# Patient Record
Sex: FEMALE | NOT HISPANIC OR LATINO | URBAN - METROPOLITAN AREA
[De-identification: names, ages, dates, MRNs, and addresses within clinical notes are randomized per-mention and may not be internally consistent; named-entity substitution may affect disease eponyms.]

---

## 2021-09-17 ENCOUNTER — TELEPHONE (OUTPATIENT)
Dept: TRANSPLANT | Facility: CLINIC | Age: 31
End: 2021-09-17

## 2021-09-17 NOTE — TELEPHONE ENCOUNTER
"N: [id number]  Voucher: Wants More Info Registered As: Family Voucher Donor  Donor Intake Start: 21 Donor Intake Complete: 21  Gender: Female Preferred Language: English  Full Name: Alia Villaseñor Is  Needed: [not answered]  E-mail: [email or web address]@"[email or web address] Phone Number: [phone]  Secondary Phone:  Contact Preference: [not answered] Best Contact Time: 9am - 5pm  Emergency Contact: Shimon Villaseñor Emergency Contact #: [phone]  Relationship to Contact: Contact is my parent  : [birth year] Age: 31  Address: [address]  City: [place]  State: [place] Postal Code: [id number]  Height: 5'4\" Weight: 120lbs  BMI: 20.6  Employment Status: Employed Has PTO for donation? Yes, using vacation  Occupation:  Requires Heavy Lifting? No  Education Level: Four Year Degree Marital Status: Single  Exercise Routine: 4-6/Week Health Insurance: Yes  Blood Type: Unknown Ethnicity/Race: White  Donor Type: Family Voucher Donor  Prefer Remote Donation: [not answered]  Physician: [facility]<br/>CECILE Miles  Motivation to donate: I have been wanting to donate a kidney for the past five or so years. There are people out there  whose lives depend on receiving a kidney, and I don't need two!  Living Donor Pre-Screening  Is In [place]  Will accept blood transfusions? Yes  Has been diagnosed with kidney disease? No  Has had a heart attack? No  Has Diabetes (High BGs)? No  Has had cancer? No  Has had kidney stones? No  Has ever been pregnant? No  Is Planning on Pregnancy? No  Is Taking Birth Control? Yes   - Birth Control Months? 24   - BirthControlForm? pill   - Birth Control Complications? other brands for past 14 years   - Is Able To Stop Birth Control? Yes  Has Used Tobacco? No  Has HIV? No  Is Currently Incarcerated? No  Is Currently Residing in [place] Yes  History Misc  Has Allergies? No  Has had Surgeries? No  Takes Medication? Yes  Medication Dose Frequency  birth control 1 pill " daily  sertraline 100mg daily  Medical History  History of High BP? Never  History of CABG (bypass surgery)? No  History of blood clots? Never  History of coronary disease? Never  History of high cholesterol or triglycerides? Never  Has stents implanted? Yes  History of chest pain during exercise? No  History of chest pain at other times? No  Results of climbing 2 flights of stairs? No Problem  Had stress test in last year? No  Has had stroke? No  Has had leg bypass? No  History of lung disease? Never  History of COPD? Never  History of TB? Never  History of Pneumonia? Never  Has respiratory issues? No  Has HIV? No  Has Gastro Issues? No  History of Gallstones? Never  History of Pancreatitis? Never  History of Liver Disease? Never  History of Hepatitis B? Never  History of Hepatitis C? Never  History of bleeding problems? Never  History of UTIs? Unknown  History of kidney damage? Never  History of Proteinuria? Never  History of Hematuria? Never  History of neuro disease? Never  History of seizure? Never  History of lupus? Never  History of paralysis? Never  History of arthritis? Never  History of neuropathy? Never  History of depression? Treated in past  History of anxiety? Still being treated  History of documented psychiatric illness? Never  History of Fibroid Uterus? Never  History of Endometriosis? Never  History of Polycystic Ovaries? Never  Has had Miscarriages? No  Has had abortions? No  Has had transfusions? No  History of Obesity? No  History of Fabry's Disease? No  History of Sickle Cell Disease? No  History of Sickle Cell Trait? No  History of Sarcoidosis? No  Has auto-immune disease? No  Has had Physical Exam? Yes   - how many years ago: 1  Has had Mammogram? No  Has had Pap Smear? Yes   - how many years ago: 1  Colonoscopy? No  Medical history comments? [no comments]  Living Donor Family Medical History  Anyone with kidney disease? No  Anyone with liver disease? No  Anyone with heart disease? No  Anyone  with coronary artery disease? No  Anyone with high blood pressure? No  Anyone with blood disorder? No  Anyone with cancer? No  Anyone with kidney cancer? No  Anyone with diabetes? No  Is mother alive? Yes  Mother's age? 60  Is father alive? Yes  Father's age? 60  How many siblings? 1  How many adult children? 0  How many children under 18? 0  Social History  Has Used Alcohol? Yes   - currently uses alcohol: Yes   - how much: 4/Weekly  Has Abused Alcohol? No  Has Used Drugs? No  Has had legal issues w/ law enforcement? No  Traveled over 100 miles from home in last year? Yes   - Traveled Where? [place]  Has had suicidal thoughts or attempts in the last five years? No

## 2021-09-20 ENCOUNTER — TELEPHONE (OUTPATIENT)
Dept: TRANSPLANT | Facility: CLINIC | Age: 31
End: 2021-09-20

## 2021-09-20 NOTE — TELEPHONE ENCOUNTER
Contacted Alia Villaseñor to introduce myself and my role, review of medical/surgical/family history and next steps.     Alia Villaseñor  is aware She can stop donor evaluation at any time.     Alia Villaseñor is a 31 year old female that would like to learn more about being a non-directed kidney donor.     Concerns from medical/surgical/family history: none. Takes medication for social anxiety and feels it has been well managed for years.     Reviewed any history of travel in endemic areas: [place]  Strongyloides- [place], [place] and [place].  Trypanosoma cruzi ([place])- [place]  West Nile Virus- [place], [place], [place], [place] and [place].     Per our Phase 1 algorithm, does not meet criteria to do preliminary testing.     Reviewed evaluation testing: Covid PCR, Iohexol, Lab work, CXR, EKG, Provider visits and functions, CT Angiogram.     Reviewed operations of selection committee and angio review meetings and the need for multidisciplinary input.     Reviewed NKR listing and transfer of care to D team if approved.     Alia would like to proceed to evaluation on 10/1/21.     Confirmed that Alia reviewed Informed consent document and all questions answered.  Reviewed that they will receive Docusign to obtain electronic signature for the following: Informed consent, SRTR data, PAUL for medical information, Auth for Electronic communication and will need their signed consent back before proceeding with evaluation.      Encouraged sign up for Lee[employer], message sent.     Message sent for scheduling.

## 2021-09-20 NOTE — TELEPHONE ENCOUNTER
Initial Independent Living Donor Advocate contact made with potential donor today.  I introduced myself and my role during the donation process, includin.  NHAN ROLE   The federal government requires that all licensed transplant centers provide the living donor with an Independent Living Donor Advocate (NHAN).  I do not meet recipients or attend meetings that discuss their care or decision to transplant them. My role is separate to avoid any conflict of interest.  My role is to ensure:  1) your rights are protected;  2) you get all the information you need from the transplant team to make a fully informed decision whether to donate;   3) that living donation is in your best interest.   4) that you have the right to decide NOT to go forward with living donation at any time during this process.  I am available to you throughout the workup, during surgery phase and follow-up at home.   2. WORKUP & PRIVACY     Your identity and workup are not shared with the recipient at any time.     There is a medical donor workup that consists of testing to determine if you are healthy enough to donate.  Workup tests include many blood draws, urine collection/ (kidney function testing), chest x-ray, EKG, CT scan. As you complete each step then you may move on to the next.  Workup can take as little or as long as you need and you can stop the process at any time.     Transplant is a treatment option, not a cure. A kidney from a living kidney donor can last 12-14 years.  Other treatment options are  donation and two types of dialysis.     This is major surgery and your estimated hospital stay is approximately 1-2 nights.  After surgery, there are driving and lifting restrictions - no driving for two weeks and no lifting over ten pounds for 6 - 8 weeks.  Donors are routinely off from work for 4 - 6 weeks after surgery, and potentially longer if they have a physical job.       If you anticipate lost wages due to donation,  donor wage reimbursement options may be available to you and will be reviewed with you during the evaluation process.      The recipient's insurance covers the medical expenses related to the donor evaluation and surgery.  However, it is important for you to carry your own health insurance to address any medical issues that are found and are NOT related to living donation.  3.  QUESTIONS  Have you received a packet from the transplant department?     Questions?    Have you discussed with anyone your potential decision to donate?   Yes.  Is anyone pressuring or coercing you to donate? No.  Have you discussed any financial arrangements with recipient around donating a kidney? No.  Are you aware that you can confidentially opt out at any time, up to and including day of donation? Yes.  At this time, would you like to proceed with the medical evaluation to see if you can be a kidney donor?  Yes.    If yes, the donor coordinator will be reaching out to you with next steps.     You can reach me or someone else on the NHAN team by calling [phone] Option 3.    NHAN NOTES: Alia wants to be a non-directed living kidney donor.    Duration of call 25 minutes

## 2021-09-21 ENCOUNTER — TELEPHONE (OUTPATIENT)
Dept: TRANSPLANT | Facility: CLINIC | Age: 31
End: 2021-09-21

## 2021-09-21 DIAGNOSIS — Z00.5 TRANSPLANT DONOR EVALUATION: Primary | ICD-10-CM

## 2021-09-21 RX ORDER — ALBUTEROL SULFATE 0.83 MG/ML
2.5 SOLUTION RESPIRATORY (INHALATION)
Status: CANCELLED | OUTPATIENT
Start: 2021-10-01

## 2021-09-21 RX ORDER — METHYLPREDNISOLONE SODIUM SUCCINATE 125 MG/2ML
125 INJECTION, POWDER, LYOPHILIZED, FOR SOLUTION INTRAMUSCULAR; INTRAVENOUS
Status: CANCELLED
Start: 2021-10-01

## 2021-09-21 RX ORDER — EPINEPHRINE 1 MG/ML
0.3 INJECTION, SOLUTION, CONCENTRATE INTRAVENOUS EVERY 5 MIN PRN
Status: CANCELLED | OUTPATIENT
Start: 2021-10-01

## 2021-09-21 RX ORDER — NALOXONE HYDROCHLORIDE 0.4 MG/ML
0.2 INJECTION, SOLUTION INTRAMUSCULAR; INTRAVENOUS; SUBCUTANEOUS
Status: CANCELLED | OUTPATIENT
Start: 2021-10-01

## 2021-09-21 RX ORDER — DIPHENHYDRAMINE HYDROCHLORIDE 50 MG/ML
50 INJECTION INTRAMUSCULAR; INTRAVENOUS
Status: CANCELLED
Start: 2021-10-01

## 2021-09-21 RX ORDER — MEPERIDINE HYDROCHLORIDE 25 MG/ML
25 INJECTION INTRAMUSCULAR; INTRAVENOUS; SUBCUTANEOUS EVERY 30 MIN PRN
Status: CANCELLED | OUTPATIENT
Start: 2021-10-01

## 2021-09-21 RX ORDER — ALBUTEROL SULFATE 90 UG/1
1-2 AEROSOL, METERED RESPIRATORY (INHALATION)
Status: CANCELLED
Start: 2021-10-01

## 2021-09-21 NOTE — TELEPHONE ENCOUNTER
Please sched kidney donor eval for 10/1/21 slot 1. Will need Iohexol sched for that day. Please sched labs and COVID test at [facility] lab on Mon 9/27. MyChart is pending. I will put in orders.

## 2021-09-27 ENCOUNTER — LAB (OUTPATIENT)
Dept: LAB | Facility: CLINIC | Age: 31
End: 2021-09-27
Attending: SURGERY

## 2021-09-27 DIAGNOSIS — Z00.5 TRANSPLANT DONOR EVALUATION: ICD-10-CM

## 2021-09-27 LAB
A1 AG RBC QL: NEGATIVE
ABO/RH(D): NORMAL
ALBUMIN SERPL-MCNC: 3.4 G/DL (ref 3.4–5)
ALBUMIN UR-MCNC: NEGATIVE MG/DL
ALP SERPL-CCNC: 113 U/L (ref 40–150)
ALT SERPL W P-5'-P-CCNC: 18 U/L (ref 0–50)
ANION GAP SERPL CALCULATED.3IONS-SCNC: 5 MMOL/L (ref 3–14)
ANTIBODY SCREEN: NEGATIVE
APPEARANCE UR: CLEAR
APTT PPP: 24 SECONDS (ref 22–38)
AST SERPL W P-5'-P-CCNC: 15 U/L (ref 0–45)
B-HCG SERPL-ACNC: <1 IU/L (ref 0–5)
BACTERIA #/AREA URNS HPF: ABNORMAL /HPF
BILIRUB SERPL-MCNC: 0.3 MG/DL (ref 0.2–1.3)
BILIRUB UR QL STRIP: NEGATIVE
BUN SERPL-MCNC: 12 MG/DL (ref 7–30)
CALCIUM SERPL-MCNC: 8.5 MG/DL (ref 8.5–10.1)
CHLORIDE BLD-SCNC: 108 MMOL/L (ref 94–109)
CHOLEST SERPL-MCNC: 215 MG/DL
CMV IGG SERPL IA-ACNC: <0.2 U/ML
CMV IGG SERPL IA-ACNC: NORMAL
CO2 SERPL-SCNC: 26 MMOL/L (ref 20–32)
COLOR UR AUTO: YELLOW
CREAT SERPL-MCNC: 0.81 MG/DL (ref 0.52–1.04)
CREAT UR-MCNC: 92 MG/DL
CREAT UR-MCNC: 92 MG/DL
EBV VCA IGG SER IA-ACNC: 38.6 U/ML
EBV VCA IGG SER IA-ACNC: POSITIVE
EBV VCA IGM SER IA-ACNC: 24.9 U/ML
EBV VCA IGM SER IA-ACNC: NORMAL
ERYTHROCYTE [DISTWIDTH] IN BLOOD BY AUTOMATED COUNT: 12.5 % (ref 10–15)
FASTING STATUS PATIENT QL REPORTED: ABNORMAL
GFR SERPL CREATININE-BSD FRML MDRD: >90 ML/MIN/1.73M2
GLUCOSE BLD-MCNC: 89 MG/DL (ref 70–99)
GLUCOSE UR STRIP-MCNC: NEGATIVE MG/DL
HBA1C MFR BLD: 5 % (ref 0–5.6)
HBV CORE AB SERPL QL IA: NONREACTIVE
HBV SURFACE AB SERPL IA-ACNC: 8.44 M[IU]/ML
HBV SURFACE AG SERPL QL IA: NONREACTIVE
HCT VFR BLD AUTO: 44.3 % (ref 35–47)
HCV AB SERPL QL IA: NONREACTIVE
HDLC SERPL-MCNC: 76 MG/DL
HGB BLD-MCNC: 14.5 G/DL (ref 11.7–15.7)
HGB UR QL STRIP: ABNORMAL
HIV 1+2 AB+HIV1 P24 AG SERPL QL IA: NONREACTIVE
INR PPP: 0.99 (ref 0.85–1.15)
KETONES UR STRIP-MCNC: NEGATIVE MG/DL
LDLC SERPL CALC-MCNC: 120 MG/DL
LEUKOCYTE ESTERASE UR QL STRIP: NEGATIVE
MCH RBC QN AUTO: 30.9 PG (ref 26.5–33)
MCHC RBC AUTO-ENTMCNC: 32.7 G/DL (ref 31.5–36.5)
MCV RBC AUTO: 95 FL (ref 78–100)
MICROALBUMIN UR-MCNC: 6 MG/L
MICROALBUMIN/CREAT UR: 6.52 MG/G CR (ref 0–25)
MUCOUS THREADS #/AREA URNS LPF: PRESENT /LPF
NITRATE UR QL: NEGATIVE
NONHDLC SERPL-MCNC: 139 MG/DL
PH UR STRIP: 5 [PH] (ref 5–7)
PHOSPHATE SERPL-MCNC: 3.7 MG/DL (ref 2.5–4.5)
PLATELET # BLD AUTO: 264 10E3/UL (ref 150–450)
POTASSIUM BLD-SCNC: 4.1 MMOL/L (ref 3.4–5.3)
PROT SERPL-MCNC: 6.9 G/DL (ref 6.8–8.8)
PROT UR-MCNC: 0.11 G/L
PROT/CREAT 24H UR: 0.12 G/G CR (ref 0–0.2)
RBC # BLD AUTO: 4.69 10E6/UL (ref 3.8–5.2)
RBC URINE: 1 /HPF
SARS-COV-2 RNA RESP QL NAA+PROBE: NEGATIVE
SODIUM SERPL-SCNC: 139 MMOL/L (ref 133–144)
SP GR UR STRIP: 1.02 (ref 1–1.03)
SPECIMEN EXPIRATION DATE: NORMAL
SQUAMOUS EPITHELIAL: <1 /HPF
T PALLIDUM AB SER QL: NONREACTIVE
TRIGL SERPL-MCNC: 94 MG/DL
URATE SERPL-MCNC: 3.3 MG/DL (ref 2.6–6)
UROBILINOGEN UR STRIP-MCNC: NORMAL MG/DL
WBC # BLD AUTO: 4.5 10E3/UL (ref 4–11)
WBC URINE: 2 /HPF

## 2021-09-27 PROCEDURE — 84100 ASSAY OF PHOSPHORUS: CPT | Performed by: PATHOLOGY

## 2021-09-27 PROCEDURE — 84550 ASSAY OF BLOOD/URIC ACID: CPT | Performed by: PATHOLOGY

## 2021-09-27 PROCEDURE — 86788 WEST NILE VIRUS AB IGM: CPT | Mod: 90 | Performed by: PATHOLOGY

## 2021-09-27 PROCEDURE — 86789 WEST NILE VIRUS ANTIBODY: CPT | Mod: 90 | Performed by: PATHOLOGY

## 2021-09-27 PROCEDURE — 85730 THROMBOPLASTIN TIME PARTIAL: CPT | Performed by: PATHOLOGY

## 2021-09-27 PROCEDURE — 86803 HEPATITIS C AB TEST: CPT | Mod: 90 | Performed by: PATHOLOGY

## 2021-09-27 PROCEDURE — 86704 HEP B CORE ANTIBODY TOTAL: CPT | Mod: 90 | Performed by: PATHOLOGY

## 2021-09-27 PROCEDURE — 36415 COLL VENOUS BLD VENIPUNCTURE: CPT | Performed by: PATHOLOGY

## 2021-09-27 PROCEDURE — 83036 HEMOGLOBIN GLYCOSYLATED A1C: CPT | Performed by: PATHOLOGY

## 2021-09-27 PROCEDURE — 86780 TREPONEMA PALLIDUM: CPT | Mod: 90 | Performed by: PATHOLOGY

## 2021-09-27 PROCEDURE — 86665 EPSTEIN-BARR CAPSID VCA: CPT | Mod: 90 | Performed by: PATHOLOGY

## 2021-09-27 PROCEDURE — 85027 COMPLETE CBC AUTOMATED: CPT | Performed by: PATHOLOGY

## 2021-09-27 PROCEDURE — 84702 CHORIONIC GONADOTROPIN TEST: CPT | Performed by: PATHOLOGY

## 2021-09-27 PROCEDURE — 87340 HEPATITIS B SURFACE AG IA: CPT | Mod: 90 | Performed by: PATHOLOGY

## 2021-09-27 PROCEDURE — 80061 LIPID PANEL: CPT | Performed by: PATHOLOGY

## 2021-09-27 PROCEDURE — 86481 TB AG RESPONSE T-CELL SUSP: CPT | Mod: 90 | Performed by: PATHOLOGY

## 2021-09-27 PROCEDURE — 86644 CMV ANTIBODY: CPT | Mod: 90 | Performed by: PATHOLOGY

## 2021-09-27 PROCEDURE — 86900 BLOOD TYPING SEROLOGIC ABO: CPT | Mod: 90 | Performed by: PATHOLOGY

## 2021-09-27 PROCEDURE — 81001 URINALYSIS AUTO W/SCOPE: CPT | Performed by: PATHOLOGY

## 2021-09-27 PROCEDURE — 85610 PROTHROMBIN TIME: CPT | Performed by: PATHOLOGY

## 2021-09-27 PROCEDURE — 86901 BLOOD TYPING SEROLOGIC RH(D): CPT | Mod: 90 | Performed by: PATHOLOGY

## 2021-09-27 PROCEDURE — U0005 INFEC AGEN DETEC AMPLI PROBE: HCPCS | Mod: 90 | Performed by: PATHOLOGY

## 2021-09-27 PROCEDURE — 86905 BLOOD TYPING RBC ANTIGENS: CPT | Mod: 90 | Performed by: PATHOLOGY

## 2021-09-27 PROCEDURE — U0003 INFECTIOUS AGENT DETECTION BY NUCLEIC ACID (DNA OR RNA); SEVERE ACUTE RESPIRATORY SYNDROME CORONAVIRUS 2 (SARS-COV-2) (CORONAVIRUS DISEASE [COVID-19]), AMPLIFIED PROBE TECHNIQUE, MAKING USE OF HIGH THROUGHPUT TECHNOLOGIES AS DESCRIBED BY CMS-2020-01-R: HCPCS | Mod: 90 | Performed by: PATHOLOGY

## 2021-09-27 PROCEDURE — 86706 HEP B SURFACE ANTIBODY: CPT | Mod: 90 | Performed by: PATHOLOGY

## 2021-09-27 PROCEDURE — 84156 ASSAY OF PROTEIN URINE: CPT | Performed by: PATHOLOGY

## 2021-09-27 PROCEDURE — 86850 RBC ANTIBODY SCREEN: CPT | Mod: 90 | Performed by: PATHOLOGY

## 2021-09-27 PROCEDURE — 80053 COMPREHEN METABOLIC PANEL: CPT | Performed by: PATHOLOGY

## 2021-09-27 PROCEDURE — 82043 UR ALBUMIN QUANTITATIVE: CPT | Performed by: PATHOLOGY

## 2021-09-28 LAB
GAMMA INTERFERON BACKGROUND BLD IA-ACNC: 0.06 IU/ML
M TB IFN-G BLD-IMP: NEGATIVE
M TB IFN-G CD4+ BCKGRND COR BLD-ACNC: 9.94 IU/ML
MITOGEN IGNF BCKGRD COR BLD-ACNC: 0.01 IU/ML
MITOGEN IGNF BCKGRD COR BLD-ACNC: 0.03 IU/ML
QUANTIFERON MITOGEN: 10 IU/ML
QUANTIFERON NIL TUBE: 0.06 IU/ML
QUANTIFERON TB1 TUBE: 0.07 IU/ML
QUANTIFERON TB2 TUBE: 0.09

## 2021-09-29 LAB
WNV IGG SER IA-ACNC: 0.12 IV
WNV IGM SER IA-ACNC: 0.08 IV

## 2021-10-01 ENCOUNTER — ALLIED HEALTH/NURSE VISIT (OUTPATIENT)
Dept: TRANSPLANT | Facility: CLINIC | Age: 31
End: 2021-10-01
Attending: SURGERY

## 2021-10-01 ENCOUNTER — OFFICE VISIT (OUTPATIENT)
Dept: TRANSPLANT | Facility: CLINIC | Age: 31
End: 2021-10-01
Attending: SURGERY

## 2021-10-01 ENCOUNTER — ANCILLARY PROCEDURE (OUTPATIENT)
Dept: CT IMAGING | Facility: CLINIC | Age: 31
End: 2021-10-01
Attending: INTERNAL MEDICINE

## 2021-10-01 ENCOUNTER — OFFICE VISIT (OUTPATIENT)
Dept: INFUSION THERAPY | Facility: CLINIC | Age: 31
End: 2021-10-01
Attending: INTERNAL MEDICINE

## 2021-10-01 ENCOUNTER — APPOINTMENT (OUTPATIENT)
Dept: TRANSPLANT | Facility: CLINIC | Age: 31
End: 2021-10-01
Attending: SURGERY

## 2021-10-01 ENCOUNTER — ANCILLARY PROCEDURE (OUTPATIENT)
Dept: GENERAL RADIOLOGY | Facility: CLINIC | Age: 31
End: 2021-10-01
Attending: INTERNAL MEDICINE

## 2021-10-01 ENCOUNTER — LAB (OUTPATIENT)
Dept: LAB | Facility: CLINIC | Age: 31
End: 2021-10-01
Attending: INTERNAL MEDICINE

## 2021-10-01 ENCOUNTER — OFFICE VISIT (OUTPATIENT)
Dept: NEPHROLOGY | Facility: CLINIC | Age: 31
End: 2021-10-01
Attending: SURGERY

## 2021-10-01 VITALS
OXYGEN SATURATION: 99 % | HEIGHT: 64 IN | DIASTOLIC BLOOD PRESSURE: 76 MMHG | HEART RATE: 81 BPM | SYSTOLIC BLOOD PRESSURE: 116 MMHG | WEIGHT: 122.3 LBS | BODY MASS INDEX: 20.88 KG/M2

## 2021-10-01 DIAGNOSIS — Z00.5 TRANSPLANT DONOR EVALUATION: Primary | ICD-10-CM

## 2021-10-01 DIAGNOSIS — Z00.5 TRANSPLANT DONOR EVALUATION: ICD-10-CM

## 2021-10-01 LAB
ALBUMIN UR-MCNC: NEGATIVE MG/DL
APPEARANCE UR: CLEAR
ATRIAL RATE - MUSE: 61 BPM
BILIRUB UR QL STRIP: NEGATIVE
COLOR UR AUTO: YELLOW
DIASTOLIC BLOOD PRESSURE - MUSE: NORMAL MMHG
GLUCOSE UR STRIP-MCNC: NEGATIVE MG/DL
HGB UR QL STRIP: ABNORMAL
INTERPRETATION ECG - MUSE: NORMAL
KETONES UR STRIP-MCNC: NEGATIVE MG/DL
LEUKOCYTE ESTERASE UR QL STRIP: NEGATIVE
MUCOUS THREADS #/AREA URNS LPF: PRESENT /LPF
NITRATE UR QL: NEGATIVE
P AXIS - MUSE: 54 DEGREES
PH UR STRIP: 5 [PH] (ref 5–7)
PR INTERVAL - MUSE: 150 MS
QRS DURATION - MUSE: 90 MS
QT - MUSE: 454 MS
QTC - MUSE: 457 MS
R AXIS - MUSE: 107 DEGREES
RBC URINE: 2 /HPF
SP GR UR STRIP: 1.02 (ref 1–1.03)
SQUAMOUS EPITHELIAL: <1 /HPF
SYSTOLIC BLOOD PRESSURE - MUSE: NORMAL MMHG
T AXIS - MUSE: 54 DEGREES
UROBILINOGEN UR STRIP-MCNC: NORMAL MG/DL
VENTRICULAR RATE- MUSE: 61 BPM
WBC URINE: <1 /HPF

## 2021-10-01 PROCEDURE — 71046 X-RAY EXAM CHEST 2 VIEWS: CPT | Performed by: RADIOLOGY

## 2021-10-01 PROCEDURE — 36415 COLL VENOUS BLD VENIPUNCTURE: CPT

## 2021-10-01 PROCEDURE — 81001 URINALYSIS AUTO W/SCOPE: CPT | Performed by: SURGERY

## 2021-10-01 PROCEDURE — 82542 COL CHROMOTOGRAPHY QUAL/QUAN: CPT | Performed by: SURGERY

## 2021-10-01 PROCEDURE — 250N000011 HC RX IP 250 OP 636: Performed by: INTERNAL MEDICINE

## 2021-10-01 PROCEDURE — 99205 OFFICE O/P NEW HI 60 MIN: CPT

## 2021-10-01 PROCEDURE — 74175 CTA ABDOMEN W/CONTRAST: CPT | Mod: GC | Performed by: RADIOLOGY

## 2021-10-01 PROCEDURE — 36415 COLL VENOUS BLD VENIPUNCTURE: CPT | Performed by: SURGERY

## 2021-10-01 PROCEDURE — 81378 HLA I & II TYPING HR: CPT

## 2021-10-01 PROCEDURE — 99204 OFFICE O/P NEW MOD 45 MIN: CPT | Performed by: SURGERY

## 2021-10-01 RX ORDER — MEPERIDINE HYDROCHLORIDE 25 MG/ML
25 INJECTION INTRAMUSCULAR; INTRAVENOUS; SUBCUTANEOUS EVERY 30 MIN PRN
Status: CANCELLED | OUTPATIENT
Start: 2021-10-01

## 2021-10-01 RX ORDER — NALOXONE HYDROCHLORIDE 0.4 MG/ML
0.2 INJECTION, SOLUTION INTRAMUSCULAR; INTRAVENOUS; SUBCUTANEOUS
Status: CANCELLED | OUTPATIENT
Start: 2021-10-01

## 2021-10-01 RX ORDER — NORETHINDRONE ACETATE AND ETHINYL ESTRADIOL .02; 1 MG/1; MG/1
TABLET ORAL
COMMUNITY
Start: 2021-09-21

## 2021-10-01 RX ORDER — IOPAMIDOL 755 MG/ML
100 INJECTION, SOLUTION INTRAVASCULAR ONCE
Status: COMPLETED | OUTPATIENT
Start: 2021-10-01 | End: 2021-10-01

## 2021-10-01 RX ORDER — ALBUTEROL SULFATE 0.83 MG/ML
2.5 SOLUTION RESPIRATORY (INHALATION)
Status: CANCELLED | OUTPATIENT
Start: 2021-10-01

## 2021-10-01 RX ORDER — EPINEPHRINE 1 MG/ML
0.3 INJECTION, SOLUTION INTRAMUSCULAR; SUBCUTANEOUS EVERY 5 MIN PRN
Status: CANCELLED | OUTPATIENT
Start: 2021-10-01

## 2021-10-01 RX ORDER — METHYLPREDNISOLONE SODIUM SUCCINATE 125 MG/2ML
125 INJECTION, POWDER, LYOPHILIZED, FOR SOLUTION INTRAMUSCULAR; INTRAVENOUS
Status: CANCELLED
Start: 2021-10-01

## 2021-10-01 RX ORDER — DIPHENHYDRAMINE HYDROCHLORIDE 50 MG/ML
50 INJECTION INTRAMUSCULAR; INTRAVENOUS
Status: CANCELLED
Start: 2021-10-01

## 2021-10-01 RX ORDER — ALBUTEROL SULFATE 90 UG/1
1-2 AEROSOL, METERED RESPIRATORY (INHALATION)
Status: CANCELLED
Start: 2021-10-01

## 2021-10-01 RX ADMIN — IOPAMIDOL 100 ML: 755 INJECTION, SOLUTION INTRAVASCULAR at 12:15

## 2021-10-01 RX ADMIN — IOHEXOL 5 ML: 300 INJECTION, SOLUTION INTRAVENOUS at 07:40

## 2021-10-01 ASSESSMENT — MIFFLIN-ST. JEOR: SCORE: 1264.75

## 2021-10-01 NOTE — PROGRESS NOTES
Donor Iohexol test    Alia Villaseñor presents today to [facility] for a Donor Iohexol test.      Progress note:  ID verified by name and .     The following information was verified with the patient:  Female Patients is there any possibility of being pregnant No  Is there a history of allergy (skin rash, swelling, ect) to:   A.  Iodine (except skin reactions to betadine): No   B. Intravenous radio-contrast agents: No   C. Seafood No     present during visit today: Not Applicable.    R.N. provided patient with educational handout regarding timed test. Yes     Iohexol administered over 2 minutes via butterfly  Positive blood return verified before and after.  20 gauge PIV placed in 20G for blood draws and CT this afternoon.    Medication administered:  Iohexol (Omnipaque 300mg iodine/ml concentration) 5 mls.    Start time: 740  Stop time: 742    Drug Waste Record    Drug Name: Iohexol  Dose: 5ml  Route administered: IV  NDC #: [id number]  Amount of waste(mL):5 ml  Reason for waste: Single use vial      Administrations This Visit     iohexol (OMNIPAQUE 300) injection 5 mL     Admin Date  10/01/2021 Action  Given Dose  5 mL Route  Intravenous Administered By  Leatha Soni RN                Evaluation nurse in transplant to draw labs at 2 and 4 hours post iohexol administration.  Patient given a slip with the times to get labs drawn and verbalized understanding of the plan.    Patient tolerated the procedure:  Yes    After the infusion patient was discharged to the next appointment.    Leatha Soni RN

## 2021-10-01 NOTE — PROGRESS NOTES
Transplant Surgery Consult Note    Medical record number: [id number]  YOB: 1990,   Consult requested by the patient for evaluation of kidney donation candidacy.    Assessment and Recommendations: Ms. Villaseñor appears to be a good candidate for kidney donation at this point in the evaluation. The following issues will need to be addressed prior to formal review:    Repeat UA.  Had period at the time of sample so there was some hematuria  Got J&J vaccine  We had a long discussion about pregnancy after donation and the risks. She does not think she wants to have children.  I encouraged her to talk with her OB and literature was provided.  I do think she understands the risks and would be fine to move forward from this standpoint.   Suitable to move forward with CT (ordered) today  Will review iohexol kidney function      The majority of our visit today was spent in counseling regarding the medical and surgical risks of kidney donation, the typical xavi-and post-operative experience and recovery/return to work pattern.  We also talked about post-op visits and longer term health care maintenance, as well as the implications of having one remaining kidney. This discussion included, but was not limited to rates of complications such as bleeding, infection, need for transfusion, reoperation, other organ injury, future bowel obstruction, incisional hernia, port site pain, varicocele, venous thrombosis, pulmonary embolism, renal failure, and death (3 per 10,000). The patient understands that if end stage renal failure happens that dialysis or transplant would be required.   At the conclusion of the visit, all questions had been answered and Ms. Villaseñor's candidacy for donation will be reviewed at our Multidisciplinary Donor Selection Committee. She will stay in contact with the nurse coordinator with any concerns.      Total time: 45 minutes        Bushra Cabral MD FACS   of  Surgery  Director, [facility].  ---------------------------------------------------------------------------------------------------    HPI: Alia Villaseñor is a 31 year old year old female who presents for a kidney donor evaluation.  Patient would like to donate as a NDD.      Personal history of:   No    Yes  Cancer:    [x]      []             Comment:     Diabetes   [x]      []  Comment:    Thombosis   [x]      []  Comment:       Hepatitis   [x]      []  Comment:    Tuberculosis   [x]      []  Comment:   Back or neck pain:  [x]      []  Comment:      Kidney stones   [x]      []  Comment:                  Kidney infections  [x]      []  Comment:           Urinary retention  [x]      []            Comment:   Regular NSAID use:  [x]      []            Comment:      Constipation:   [x]      []            Comment:      Protestant  [x]      []            Comment:      Other:    []      [x]            Comment:   Anxiety and birth control      Past Medical History:   Diagnosis Date     Anxiety     social anxiety     No past surgical history on file.  No family history on file.  Social History     Socioeconomic History     Marital status: Single     Spouse name: Not on file     Number of children: Not on file     Years of education: Not on file     Highest education level: Not on file   Occupational History     Not on file   Tobacco Use     Smoking status: Never Smoker     Smokeless tobacco: Never Used   Substance and Sexual Activity     Alcohol use: Yes     Comment: 1 DRINK 3-4 DAYS A WEEK     Drug use: Never     Sexual activity: Not on file   Other Topics Concern     Not on file   Social History Narrative     Not on file     Social Determinants of Health     Financial Resource Strain:      Difficulty of Paying Living Expenses:    Food Insecurity:      Worried About Running Out of Food in the Last Year:      Ran Out of Food in the Last Year:    Transportation Needs:      Lack of Transportation  (Medical):      Lack of Transportation (Non-Medical):    Physical Activity:      Days of Exercise per Week:      Minutes of Exercise per Session:    Stress:      Feeling of Stress :    Social Connections:      Frequency of Communication with Friends and Family:      Frequency of Social Gatherings with Friends and Family:      Attends Sabianism Services:      Active Member of Clubs or Organizations:      Attends Club or Organization Meetings:      Marital Status:    Intimate Partner Violence:      Fear of Current or Ex-Partner:      Emotionally Abused:      Physically Abused:      Sexually Abused:        ROS:   CONSTITUTIONAL:  No fevers or chills  EYES: negative for icterus  ENT:  negative for hearing loss, tinnitus and sore throat  RESPIRATORY:  negative for cough, sputum, dyspnea  CARDIOVASCULAR:  negative for chest pain  GASTROINTESTINAL:  negative for nausea, vomiting, diarrhea or constipation  GENITOURINARY:  negative for incontinence, dysuria, bladder emptying problems  HEME:  No easy bruising  INTEGUMENT:  negative for rash and pruritus  NEURO:  Negative for headache, seizure disorder    Allergies:   No Known Allergies    Medications:  Prescription Medications as of 10/1/2021       Rx Number Disp Refills Start End Last Dispensed Date Next Fill Date Owning Pharmacy    norethindrone-ethinyl estradiol (MICROGESTIN 1/20) 1-20 MG-MCG tablet    9/21/2021        Sig: TAKE 1 TABLET BY MOUTH DAILY(ACTIVE TABLETS ONLY) FOR 9 WEEKS THEN 1 WEEK OFF THEN REPEAT TABLET CYCLE    Class: Historical    sertraline (ZOLOFT) 50 MG tablet    9/25/2021        Sig: Take 150 mg by mouth    Class: Historical    Route: Oral    sertraline (ZOLOFT) 50 MG tablet    9/25/2021        Sig: Take 2 tablets by mouth daily    Class: Historical    Route: Oral      Clinic-Administered Medications as of 10/1/2021       Dose Frequency Start End    iohexol (OMNIPAQUE 300) injection 5 mL (Completed) 5 mL ONCE 10/1/2021 10/1/2021    Route: Intravenous     sodium chloride (PF) 0.9% PF flush 5 mL 5 mL ONCE 10/1/2021     Admin Instructions: Post infusion line flush.    Class: E-Prescribe    Route: Intracatheter          Exam:   Pulse:  [81] 81  BP: (107-123)/(71-79) 116/76  SpO2:  [99 %] 99 %  Body mass index is 20.99 kg/m .  Pulse:  [81] 81  BP: (107-123)/(71-79) 116/76  SpO2:  [99 %] 99 %  Appearance: in no apparent distress.   Skin: normal  Head and Neck: Normal, no rashes or jaundice  Respiratory: normal respiratory excursions, no audible wheeze  Cardiovascular: RRR  Abdomen: flat, No surgical scars   Extremeties: Edema, none  Neuro: without deficit       Diagnostics:   Recent Results (from the past 336 hour(s))   ABO and Rh    Collection Time: 09/27/21  7:24 AM   Result Value Ref Range    ABO/RH(D) O POS     SPECIMEN EXPIRATION DATE [id number]    Partial thromboplastin time    Collection Time: 09/27/21  7:36 AM   Result Value Ref Range    aPTT 24 22 - 38 Seconds   INR    Collection Time: 09/27/21  7:36 AM   Result Value Ref Range    INR 0.99 0.85 - 1.15   Blood Group A Subtype    Collection Time: 09/27/21  7:37 AM   Result Value Ref Range    A1 Antigen Type Negative     SPECIMEN EXPIRATION DATE [id number]    EBV Capsid Antibody IgM    Collection Time: 09/27/21  7:37 AM   Result Value Ref Range    EBV Capsid Lindy IgM Instrument Value 24.9 <36.0 U/mL    EBV Capsid Antibody IgM No detectable antibody. No detectable antibody.   EBV Capsid Antibody IgG    Collection Time: 09/27/21  7:37 AM   Result Value Ref Range    EBV Capsid Lindy IgG Instrument Value 38.6 (H) <18.0 U/mL    EBV Capsid Antibody IgG Positive (A) No detectable antibody.   CMV Antibody IgG    Collection Time: 09/27/21  7:37 AM   Result Value Ref Range    CMV Lindy IgG Instrument Value <0.20 <0.60 U/mL    CMV Antibody IgG No detectable antibody. No detectable antibody.    HCG quantitative pregnancy    Collection Time: 09/27/21  7:37 AM   Result Value Ref Range    hCG Quantitative <1 0 - 5 IU/L    West Nile Virus Antibody IgG IgM    Collection Time: 09/27/21  7:37 AM   Result Value Ref Range    West Nile IgG Serum 0.12 <=1.29 IV    West Nile IgM Serum 0.08 <=0.89 IV   Treponema Abs w Reflex to RPR and Titer    Collection Time: 09/27/21  7:37 AM   Result Value Ref Range    Treponema Antibody Total Nonreactive Nonreactive   HIV Antigen Antibody Combo Pretransplant    Collection Time: 09/27/21  7:37 AM   Result Value Ref Range    HIV Antigen Antibody Combo Pretransplant Nonreactive Nonreactive   Hepatitis C antibody    Collection Time: 09/27/21  7:37 AM   Result Value Ref Range    Hepatitis C Antibody Nonreactive Nonreactive   Hepatitis B surface antigen    Collection Time: 09/27/21  7:37 AM   Result Value Ref Range    Hepatitis B Surface Antigen Nonreactive Nonreactive   Hepatitis B Surface Antibody    Collection Time: 09/27/21  7:37 AM   Result Value Ref Range    Hepatitis B Surface Antibody 8.44 (H) <8.00 m[IU]/mL   Hepatitis B core antibody    Collection Time: 09/27/21  7:37 AM   Result Value Ref Range    Hepatitis B Core Antibody Total Nonreactive Nonreactive   CBC with platelets    Collection Time: 09/27/21  7:37 AM   Result Value Ref Range    WBC Count 4.5 4.0 - 11.0 10e3/uL    RBC Count 4.69 3.80 - 5.20 10e6/uL    Hemoglobin 14.5 11.7 - 15.7 g/dL    Hematocrit 44.3 35.0 - 47.0 %    MCV 95 78 - 100 fL    MCH 30.9 26.5 - 33.0 pg    MCHC 32.7 31.5 - 36.5 g/dL    RDW 12.5 10.0 - 15.0 %    Platelet Count 264 150 - 450 10e3/uL   Hemoglobin A1c    Collection Time: 09/27/21  7:37 AM   Result Value Ref Range    Hemoglobin A1C 5.0 0.0 - 5.6 %   Phosphorus    Collection Time: 09/27/21  7:37 AM   Result Value Ref Range    Phosphorus 3.7 2.5 - 4.5 mg/dL   Uric acid    Collection Time: 09/27/21  7:37 AM   Result Value Ref Range    Uric Acid 3.3 2.6 - 6.0 mg/dL   Lipid Profile    Collection Time: 09/27/21  7:37 AM   Result Value Ref Range    Cholesterol 215 (H) <200 mg/dL    Triglycerides 94 <150 mg/dL    Direct  Measure HDL 76 >=50 mg/dL    LDL Cholesterol Calculated 120 (H) <=100 mg/dL    Non HDL Cholesterol 139 (H) <130 mg/dL    Patient Fasting > 8hrs? Unknown    Comprehensive metabolic panel    Collection Time: 09/27/21  7:37 AM   Result Value Ref Range    Sodium 139 133 - 144 mmol/L    Potassium 4.1 3.4 - 5.3 mmol/L    Chloride 108 94 - 109 mmol/L    Carbon Dioxide (CO2) 26 20 - 32 mmol/L    Anion Gap 5 3 - 14 mmol/L    Urea Nitrogen 12 7 - 30 mg/dL    Creatinine 0.81 0.52 - 1.04 mg/dL    Calcium 8.5 8.5 - 10.1 mg/dL    Glucose 89 70 - 99 mg/dL    Alkaline Phosphatase 113 40 - 150 U/L    AST 15 0 - 45 U/L    ALT 18 0 - 50 U/L    Protein Total 6.9 6.8 - 8.8 g/dL    Albumin 3.4 3.4 - 5.0 g/dL    Bilirubin Total 0.3 0.2 - 1.3 mg/dL    GFR Estimate >90 >60 mL/min/1.73m2   Adult Type and Screen    Collection Time: 09/27/21  7:37 AM   Result Value Ref Range    ABO/RH(D) O POS     Antibody Screen NEGATIVE     SPECIMEN EXPIRATION DATE [id number]    Asymptomatic COVID-19 Virus (Coronavirus) by PCR Nasopharyngeal    Collection Time: 09/27/21  7:38 AM    Specimen: Nasopharyngeal; Swab   Result Value Ref Range    SARS CoV2 PCR Negative Negative   ABO and Rh 2nd type and screen required    Collection Time: 09/27/21  7:38 AM   Result Value Ref Range    ABO/RH(D) O POS     SPECIMEN EXPIRATION DATE [id number]    Quantiferon TB Gold Plus Grey Tube    Collection Time: 09/27/21  7:38 AM    Specimen: Peripheral Blood   Result Value Ref Range    Quantiferon Nil Tube 0.06 IU/mL   Quantiferon TB Gold Plus Green Tube    Collection Time: 09/27/21  7:38 AM    Specimen: Peripheral Blood   Result Value Ref Range    Quantiferon TB1 Tube 0.07 IU/mL   Quantiferon TB Gold Plus Yellow Tube    Collection Time: 09/27/21  7:38 AM    Specimen: Peripheral Blood   Result Value Ref Range    Quantiferon TB2 Tube 0.09    Quantiferon TB Gold Plus Purple Tube    Collection Time: 09/27/21  7:38 AM    Specimen: Peripheral Blood   Result Value Ref Range     Quantiferon Mitogen 10.00 IU/mL   Quantiferon TB Gold Plus    Collection Time: 09/27/21  7:38 AM    Specimen: Peripheral Blood   Result Value Ref Range    Quantiferon-TB Gold Plus Negative Negative    TB1 Ag minus Nil Value 0.01 IU/mL    TB2 Ag minus Nil Value 0.03 IU/mL    Mitogen minus Nil Result 9.94 IU/mL    Nil Result 0.06 IU/mL   Protein  random urine with Creat Ratio    Collection Time: 09/27/21  7:53 AM   Result Value Ref Range    Total Protein Random Urine g/L 0.11 g/L    Total Protein Urine g/gr Creatinine 0.12 0.00 - 0.20 g/g Cr    Creatinine Urine mg/dL 92 mg/dL   Albumin Random Urine Quantitative with Creat Ratio    Collection Time: 09/27/21  7:53 AM   Result Value Ref Range    Creatinine Urine mg/dL 92 mg/dL    Albumin Urine mg/L 6 mg/L    Albumin Urine mg/g Cr 6.52 0.00 - 25.00 mg/g Cr   Routine UA with microscopic    Collection Time: 09/27/21  7:53 AM   Result Value Ref Range    Color Urine Yellow Colorless, Straw, Light Yellow, Yellow    Appearance Urine Clear Clear    Glucose Urine Negative Negative mg/dL    Bilirubin Urine Negative Negative    Ketones Urine Negative Negative mg/dL    Specific Gravity Urine 1.016 1.003 - 1.035    Blood Urine Small (A) Negative    pH Urine 5.0 5.0 - 7.0    Protein Albumin Urine Negative Negative mg/dL    Urobilinogen Urine Normal Normal, 2.0 mg/dL    Nitrite Urine Negative Negative    Leukocyte Esterase Urine Negative Negative    Bacteria Urine Few (A) None Seen /HPF    Mucus Urine Present (A) None Seen /LPF    RBC Urine 1 <=2 /HPF    WBC Urine 2 <=5 /HPF    Squamous Epithelials Urine <1 <=1 /HPF

## 2021-10-01 NOTE — PROGRESS NOTES
[facility] Solid Organ Transplant  Outpatient MNT: Kidney Donor Evaluation    Current BMI: 21 (HT 64 in,  lbs/55 kg)  BMI is within recommendation of <30 for kidney donation    8 Year Estimated Risk of T2DM  </= 3%     Time Spent: 15 minutes  Visit Type: Initial   Referring Physician: Misha  Pt accompanied by: self     Nutrition Assessment  Vegetarian x 11 years    Vitamins, Supplements, Pertinent Meds: none   Herbal Medicines/Supplements: none     Weight hx: stable    Food Security: any concerns about having enough money to buy food or access to grocery stores? No     Diet Recall  Breakfast Granola bar    Lunch Hext or salad (from home)   Dinner Pizza, pasta w/ tomato basil and mozzarella, veggie burger and fries    Snacks Occasional sweet    Beverages Coffee, sparkling water, juice, occasional energy drink/kombucha, non-dairy milk    Alcohol 3-4 drinks/week   Dining out 1x/month at the most     Physical Activity  Yoga 3x/week  Dog walking   Works at [employer]  Recent Labs   Lab Test 09/27/21  0737   CHOL 215*   HDL 76   *   TRIG 94       FBG = 89  A1c = 5  BP = wnl    Prediction of Incident Diabetes Mellitus in Middle-aged Adults: The [place] Offspring Study  Jorge Yung MD; James B. Meigs, MD, MPH; Eleanor Mosley, PhD; Yessi Monterroso MD, MPH; Lyle Sampson MD; Elder hPillips Sr,   PhD  Pt's estimated risk for T2DM (per Table 6 above)  Pt received points for the following criteria: none   Total points: 0  8-Year estimated risk of T2DM: </= 3%    Nutrition Diagnosis  No nutrition diagnosis identified at this time.    Nutrition Intervention  Nutrition education provided:  Reviewed overall healthy diet guidelines for pre and post kidney donation. Discussed maintenance of a healthy weight and Na+ intake <3000 mg/day (<2000 mg/day if HTN).    Avoid the following post op d/t unknown effects on the organs:  - Herbal, Chinese, holistic, chiropractic, natural,  alternative medicines and supplements  - Detoxes and cleanses  - Weight loss pills  - Protein powders or other products with extracts or herbs (ie green tea extract)    Patient Understanding: Pt verbalized understanding of education provided.  Expected Engagement: Good  Follow-Up Plans: PRN     Nutrition Goals  No nutrition goals identified at this time     Lin Bynum, RD, LD, CCTD

## 2021-10-01 NOTE — LETTER
10/1/2021       RE: Alia Villaseñor  [address]  [place] [address]     Dear Colleague,    Thank you for referring your patient, Alia Villaseñor, to the [facility] [place] at [facility]. Please see a copy of my visit note below.    TRANSPLANT NEPHROLOGY DONOR EVALUATION    Assessment and Plan:  # Prospective Kidney Transplant Donor: Patient with no issues that need to be addressed prior to donation. Patient's blood pressure is acceptable at this visit, kidney function appears to be acceptable with Iohexol pending, and urinalysis is bland.    Discussed the risks of donating a kidney, including the surgical risk and the possible risks of living with one kidney.    Education about expected post-donation kidney function and how chronic kidney disease (CKD) and end stage kidney disease (ESKD) might potentially impact the donor in the future, include, but not limited to:       - On average, donors will have 25-35% permanent loss of kidney function at donation.       - Baseline risk of ESKD may slightly exceed that of members of the general population with the same demographic profile.       - Donor risks must be interpreted in light of known epidemiology of both CKD or ESKD, such as that CKD generally develops in midlife (40-50 years old) and ESKD generally develops after age 60.       - Medical evaluation of young potential donors cannot predict lifetime risk of CKD or ESKD.       - Donors may be at higher risk for CKD if they sustain damage to the remaining kidney.       - Development of CKD and progression of ESKD may be more rapid with only 1 kidney.       - Some type of kidney replacement therapy, either kidney transplant or dialysis, is required when reaching ESKD.    Potential medical or surgical risks include, but not limited to:       - Death.       - Scars, pain, fatigue, and other consequences typical of any surgical  procedure.       - Decreased kidney function.       - Abdominal or bowel symptoms, such as bloating and nausea, and developing bowel obstruction.       - Kidney failure (ESKD) and the need for a kidney transplant or dialysis for the donor.       - Impact of obesity, hypertension, or other donor-specific medical conditions on morbidity and mortality of the potential donor.    Patients overall evaluation will be discussed with the transplant team and a final recommendation on the patients' suitability to be a kidney transplant donor will be made at that time.    Consult:  Alia Villaseñor was seen in consultation at the request of Dr. Bushra Cabral for evaluation as a potential kidney transplant donor.    Reason for Visit:  Alia Villaseñor is a 31 year old female who presents for a kidney donor evaluation.  Patient would like to be a non-directed donor.    Present Condition and Donor-Related Medical History:    Energy level is good and has been normal.  She is active and does get some exercise.  Denies any chest pain or shortness of breath with exertion.  Appetite is good and no recent weight change.  No nausea, vomiting or diarrhea.  No fever, sweats or chills.  No leg swelling.  No pain or burning with urination.         Kidney Disease Hx:       h/o Kidney Problems: No  Family h/o Genetic Kidney Disease: No       h/o HTN: No    Usual BP: Doesn't know       h/o Protein in Urine: No  h/o Blood in Urine: No       h/o Kidney Stones: No  h/o Kidney Injury: No       h/o Recurrent UTI: No  h/o Genitourinary Problems: No       h/o Chronic NSAID Use: No         Other Medical Hx:       h/o DM: No             h/o Gastrointestinal, Pancreas or Liver Problems: No       h/o Lung or Heart Problems: No       h/o Hematologic Problems: No  h/o Bleeding or Clotting Problems: No       h/o Cancer: No       h/o Infection Problems: No       h/o Gestational DM: Not applicable      h/o Preeclampsia: Not applicable         Skin Cancer  Risk:       h/o more than 50 moles: No       h/o extensive sun exposure: No       h/o melanoma: No       Family h/o melanoma: No         Mental Health Assessment:       h/o Depression: Yes: but not an issue now       h/o Psychiatric Illness: Yes: controlled       h/o Suicidal Attempt(s): No    COVID Status:  Vaccination Up To Date: Yes  H/o COVID Infection: No     Review Of Systems:   A comprehensive review of systems was obtained and negative, except as noted in the HPI or PMH.    Past Medical History:   History was taken from the patient as noted below.  Past Medical History:   Diagnosis Date     Anxiety     social anxiety     Depression        Past Social History:   Past Surgical History:   Procedure Laterality Date     WISDOM TOOTH EXTRACTION       Personal or family history of anesthesia problems: No    Family History:   Family History   Problem Relation Age of Onset     Nephrolithiasis Father      Diabetes Paternal Uncle      Kidney Disease No family hx of      Hypertension No family hx of      Cancer No family hx of           Specific Family History in First Degree Relatives:       FH of Kidney Dz: No FH of DM: No       FH of HTN: No  FH of CAD: No       FH of Cancer: No  FH of Kidney Cancer: No    Personal History:   Social History     Socioeconomic History     Marital status: Single     Spouse name: Not on file     Number of children: 0     Years of education: Not on file     Highest education level: Not on file   Occupational History     Not on file   Tobacco Use     Smoking status: Never Smoker     Smokeless tobacco: Never Used   Substance and Sexual Activity     Alcohol use: Yes     Alcohol/week: 3.0 - 5.0 standard drinks     Types: 3 - 5 Standard drinks or equivalent per week     Comment: 1 DRINK 3-4 DAYS A WEEK     Drug use: Never     Sexual activity: Not on file   Other Topics Concern     Not on file   Social History Narrative     Not on file     Social Determinants of Health     Financial Resource  Strain:      Difficulty of Paying Living Expenses:    Food Insecurity:      Worried About Running Out of Food in the Last Year:      Ran Out of Food in the Last Year:    Transportation Needs:      Lack of Transportation (Medical):      Lack of Transportation (Non-Medical):    Physical Activity:      Days of Exercise per Week:      Minutes of Exercise per Session:    Stress:      Feeling of Stress :    Social Connections:      Frequency of Communication with Friends and Family:      Frequency of Social Gatherings with Friends and Family:      Attends [employer] Services:      Active Member of Clubs or Organizations:      Attends Club or Organization Meetings:      Marital Status:    Intimate Partner Violence:      Fear of Current or Ex-Partner:      Emotionally Abused:      Physically Abused:      Sexually Abused:           Specific Social History:       Health Insurance Status: Yes       Employment Status: Full time  Occupation: [employer]       Living Arrangements: lives alone       Social Support: Yes       Presence of increased risk for disease transmission behaviors as defined by PHS guidelines: No        Allergies:  No Known Allergies    Medications:  Current Outpatient Medications   Medication Sig     norethindrone-ethinyl estradiol (MICROGESTIN 1/20) 1-20 MG-MCG tablet TAKE 1 TABLET BY MOUTH DAILY(ACTIVE TABLETS ONLY) FOR 9 WEEKS THEN 1 WEEK OFF THEN REPEAT TABLET CYCLE     sertraline (ZOLOFT) 50 MG tablet Take 150 mg by mouth     sertraline (ZOLOFT) 50 MG tablet Take 2 tablets by mouth daily     No current facility-administered medications for this visit.     There are no discontinued medications.      Vitals:  Vital Signs 10/1/2021 10/1/2021 10/1/2021   Systolic 123 107 116   Diastolic 79 71 76   Pulse - - -   Weight (LB) - - -   Height - - -   BMI (Calculated) - - -   O2 - - -       Exam:   GENERAL APPEARANCE: alert and no distress  HENT: mouth without ulcers or lesions  LYMPHATICS: no cervical or  supraclavicular nodes  RESP: lungs clear to auscultation - no rales, rhonchi or wheezes  CV: regular rhythm, normal rate, no rub, no murmur  EDEMA: no LE edema bilaterally  ABDOMEN: soft, nondistended, nontender, bowel sounds normal  MS: extremities normal - no gross deformities noted, no evidence of inflammation in joints, no muscle tenderness  SKIN: no rash    Results:   Labs and imaging were ordered for this visit and reviewed by me.  Recent Results (from the past 336 hour(s))   ABO and Rh    Collection Time: 09/27/21  7:24 AM   Result Value Ref Range    ABO/RH(D) O POS     SPECIMEN EXPIRATION DATE [id number]    Partial thromboplastin time    Collection Time: 09/27/21  7:36 AM   Result Value Ref Range    aPTT 24 22 - 38 Seconds   INR    Collection Time: 09/27/21  7:36 AM   Result Value Ref Range    INR 0.99 0.85 - 1.15   Blood Group A Subtype    Collection Time: 09/27/21  7:37 AM   Result Value Ref Range    A1 Antigen Type Negative     SPECIMEN EXPIRATION DATE [id number]    EBV Capsid Antibody IgM    Collection Time: 09/27/21  7:37 AM   Result Value Ref Range    EBV Capsid Lindy IgM Instrument Value 24.9 <36.0 U/mL    EBV Capsid Antibody IgM No detectable antibody. No detectable antibody.   EBV Capsid Antibody IgG    Collection Time: 09/27/21  7:37 AM   Result Value Ref Range    EBV Capsid Lindy IgG Instrument Value 38.6 (H) <18.0 U/mL    EBV Capsid Antibody IgG Positive (A) No detectable antibody.   CMV Antibody IgG    Collection Time: 09/27/21  7:37 AM   Result Value Ref Range    CMV Lindy IgG Instrument Value <0.20 <0.60 U/mL    CMV Antibody IgG No detectable antibody. No detectable antibody.    HCG quantitative pregnancy    Collection Time: 09/27/21  7:37 AM   Result Value Ref Range    hCG Quantitative <1 0 - 5 IU/L   West Nile Virus Antibody IgG IgM    Collection Time: 09/27/21  7:37 AM   Result Value Ref Range    West Nile IgG Serum 0.12 <=1.29 IV    West Nile IgM Serum 0.08 <=0.89 IV   Treponema Abs w  Reflex to RPR and Titer    Collection Time: 09/27/21  7:37 AM   Result Value Ref Range    Treponema Antibody Total Nonreactive Nonreactive   HIV Antigen Antibody Combo Pretransplant    Collection Time: 09/27/21  7:37 AM   Result Value Ref Range    HIV Antigen Antibody Combo Pretransplant Nonreactive Nonreactive   Hepatitis C antibody    Collection Time: 09/27/21  7:37 AM   Result Value Ref Range    Hepatitis C Antibody Nonreactive Nonreactive   Hepatitis B surface antigen    Collection Time: 09/27/21  7:37 AM   Result Value Ref Range    Hepatitis B Surface Antigen Nonreactive Nonreactive   Hepatitis B Surface Antibody    Collection Time: 09/27/21  7:37 AM   Result Value Ref Range    Hepatitis B Surface Antibody 8.44 (H) <8.00 m[IU]/mL   Hepatitis B core antibody    Collection Time: 09/27/21  7:37 AM   Result Value Ref Range    Hepatitis B Core Antibody Total Nonreactive Nonreactive   CBC with platelets    Collection Time: 09/27/21  7:37 AM   Result Value Ref Range    WBC Count 4.5 4.0 - 11.0 10e3/uL    RBC Count 4.69 3.80 - 5.20 10e6/uL    Hemoglobin 14.5 11.7 - 15.7 g/dL    Hematocrit 44.3 35.0 - 47.0 %    MCV 95 78 - 100 fL    MCH 30.9 26.5 - 33.0 pg    MCHC 32.7 31.5 - 36.5 g/dL    RDW 12.5 10.0 - 15.0 %    Platelet Count 264 150 - 450 10e3/uL   Hemoglobin A1c    Collection Time: 09/27/21  7:37 AM   Result Value Ref Range    Hemoglobin A1C 5.0 0.0 - 5.6 %   Phosphorus    Collection Time: 09/27/21  7:37 AM   Result Value Ref Range    Phosphorus 3.7 2.5 - 4.5 mg/dL   Uric acid    Collection Time: 09/27/21  7:37 AM   Result Value Ref Range    Uric Acid 3.3 2.6 - 6.0 mg/dL   Lipid Profile    Collection Time: 09/27/21  7:37 AM   Result Value Ref Range    Cholesterol 215 (H) <200 mg/dL    Triglycerides 94 <150 mg/dL    Direct Measure HDL 76 >=50 mg/dL    LDL Cholesterol Calculated 120 (H) <=100 mg/dL    Non HDL Cholesterol 139 (H) <130 mg/dL    Patient Fasting > 8hrs? Unknown    Comprehensive metabolic panel     Collection Time: 09/27/21  7:37 AM   Result Value Ref Range    Sodium 139 133 - 144 mmol/L    Potassium 4.1 3.4 - 5.3 mmol/L    Chloride 108 94 - 109 mmol/L    Carbon Dioxide (CO2) 26 20 - 32 mmol/L    Anion Gap 5 3 - 14 mmol/L    Urea Nitrogen 12 7 - 30 mg/dL    Creatinine 0.81 0.52 - 1.04 mg/dL    Calcium 8.5 8.5 - 10.1 mg/dL    Glucose 89 70 - 99 mg/dL    Alkaline Phosphatase 113 40 - 150 U/L    AST 15 0 - 45 U/L    ALT 18 0 - 50 U/L    Protein Total 6.9 6.8 - 8.8 g/dL    Albumin 3.4 3.4 - 5.0 g/dL    Bilirubin Total 0.3 0.2 - 1.3 mg/dL    GFR Estimate >90 >60 mL/min/1.73m2   Adult Type and Screen    Collection Time: 09/27/21  7:37 AM   Result Value Ref Range    ABO/RH(D) O POS     Antibody Screen NEGATIVE     SPECIMEN EXPIRATION DATE [id number]    Asymptomatic COVID-19 Virus (Coronavirus) by PCR Nasopharyngeal    Collection Time: 09/27/21  7:38 AM    Specimen: Nasopharyngeal; Swab   Result Value Ref Range    SARS CoV2 PCR Negative Negative   ABO and Rh 2nd type and screen required    Collection Time: 09/27/21  7:38 AM   Result Value Ref Range    ABO/RH(D) O POS     SPECIMEN EXPIRATION DATE [id number]    Quantiferon TB Gold Plus Grey Tube    Collection Time: 09/27/21  7:38 AM    Specimen: Peripheral Blood   Result Value Ref Range    Quantiferon Nil Tube 0.06 IU/mL   Quantiferon TB Gold Plus Green Tube    Collection Time: 09/27/21  7:38 AM    Specimen: Peripheral Blood   Result Value Ref Range    Quantiferon TB1 Tube 0.07 IU/mL   Quantiferon TB Gold Plus Yellow Tube    Collection Time: 09/27/21  7:38 AM    Specimen: Peripheral Blood   Result Value Ref Range    Quantiferon TB2 Tube 0.09    Quantiferon TB Gold Plus Purple Tube    Collection Time: 09/27/21  7:38 AM    Specimen: Peripheral Blood   Result Value Ref Range    Quantiferon Mitogen 10.00 IU/mL   Quantiferon TB Gold Plus    Collection Time: 09/27/21  7:38 AM    Specimen: Peripheral Blood   Result Value Ref Range    Quantiferon-TB Gold Plus Negative  Negative    TB1 Ag minus Nil Value 0.01 IU/mL    TB2 Ag minus Nil Value 0.03 IU/mL    Mitogen minus Nil Result 9.94 IU/mL    Nil Result 0.06 IU/mL   Protein  random urine with Creat Ratio    Collection Time: 09/27/21  7:53 AM   Result Value Ref Range    Total Protein Random Urine g/L 0.11 g/L    Total Protein Urine g/gr Creatinine 0.12 0.00 - 0.20 g/g Cr    Creatinine Urine mg/dL 92 mg/dL   Albumin Random Urine Quantitative with Creat Ratio    Collection Time: 09/27/21  7:53 AM   Result Value Ref Range    Creatinine Urine mg/dL 92 mg/dL    Albumin Urine mg/L 6 mg/L    Albumin Urine mg/g Cr 6.52 0.00 - 25.00 mg/g Cr   Routine UA with microscopic    Collection Time: 09/27/21  7:53 AM   Result Value Ref Range    Color Urine Yellow Colorless, Straw, Light Yellow, Yellow    Appearance Urine Clear Clear    Glucose Urine Negative Negative mg/dL    Bilirubin Urine Negative Negative    Ketones Urine Negative Negative mg/dL    Specific Gravity Urine 1.016 1.003 - 1.035    Blood Urine Small (A) Negative    pH Urine 5.0 5.0 - 7.0    Protein Albumin Urine Negative Negative mg/dL    Urobilinogen Urine Normal Normal, 2.0 mg/dL    Nitrite Urine Negative Negative    Leukocyte Esterase Urine Negative Negative    Bacteria Urine Few (A) None Seen /HPF    Mucus Urine Present (A) None Seen /LPF    RBC Urine 1 <=2 /HPF    WBC Urine 2 <=5 /HPF    Squamous Epithelials Urine <1 <=1 /HPF   UA with Microscopic reflex to Culture    Collection Time: 10/01/21 12:03 PM    Specimen: Urine, Midstream   Result Value Ref Range    Color Urine Yellow Colorless, Straw, Light Yellow, Yellow    Appearance Urine Clear Clear    Glucose Urine Negative Negative mg/dL    Bilirubin Urine Negative Negative    Ketones Urine Negative Negative mg/dL    Specific Gravity Urine 1.021 1.003 - 1.035    Blood Urine Small (A) Negative    pH Urine 5.0 5.0 - 7.0    Protein Albumin Urine Negative Negative mg/dL    Urobilinogen Urine Normal Normal, 2.0 mg/dL    Nitrite Urine  Negative Negative    Leukocyte Esterase Urine Negative Negative    Mucus Urine Present (A) None Seen /LPF    RBC Urine 2 <=2 /HPF    WBC Urine <1 <=5 /HPF    Squamous Epithelials Urine <1 <=1 /HPF   EKG 12-lead complete w/read - Clinics    Collection Time: 10/01/21 12:46 PM   Result Value Ref Range    Systolic Blood Pressure  mmHg    Diastolic Blood Pressure  mmHg    Ventricular Rate 61 BPM    Atrial Rate 61 BPM    CT Interval 150 ms    QRS Duration 90 ms     ms    QTc 457 ms    P Axis 54 degrees    R AXIS 107 degrees    T Axis 54 degrees    Interpretation ECG       Sinus rhythm  Possible Left atrial enlargement  Rightward axis  Borderline ECG  No previous ECGs available  Confirmed by MD PORSHA, TIFFANIE ([id number]) on 10/1/2021 6:12:05 PM                 Again, thank you for allowing me to participate in the care of your patient.      Sincerely,    hKanh Sykes MD

## 2021-10-01 NOTE — LETTER
10/1/2021         RE: Alia Villaseñor  [address]  [place] [address]        Dear Colleague,    Thank you for referring your patient, Alia Villaseñor, to the [facility]. Please see a copy of my visit note below.    Transplant Surgery Consult Note    Medical record number: [id number]  YOB: 1990,   Consult requested by the patient for evaluation of kidney donation candidacy.    Assessment and Recommendations: Ms. Villaseñor appears to be a good candidate for kidney donation at this point in the evaluation. The following issues will need to be addressed prior to formal review:    Repeat UA.  Had period at the time of sample so there was some hematuria  Got J&J vaccine  We had a long discussion about pregnancy after donation and the risks. She does not think she wants to have children.  I encouraged her to talk with her OB and literature was provided.  I do think she understands the risks and would be fine to move forward from this standpoint.   Suitable to move forward with CT (ordered) today  Will review iohexol kidney function      The majority of our visit today was spent in counseling regarding the medical and surgical risks of kidney donation, the typical xavi-and post-operative experience and recovery/return to work pattern.  We also talked about post-op visits and longer term health care maintenance, as well as the implications of having one remaining kidney. This discussion included, but was not limited to rates of complications such as bleeding, infection, need for transfusion, reoperation, other organ injury, future bowel obstruction, incisional hernia, port site pain, varicocele, venous thrombosis, pulmonary embolism, renal failure, and death (3 per 10,000). The patient understands that if end stage renal failure happens that dialysis or transplant would be required.   At the conclusion of the visit, all questions had been answered and Ms. Villaseñor's  candidacy for donation will be reviewed at our Multidisciplinary Donor Selection Committee. She will stay in contact with the nurse coordinator with any concerns.      Total time: 45 minutes        Bushra Cabral MD FACS  Assistant Professor of Surgery  Director, Living Kidney Donor Program.  ---------------------------------------------------------------------------------------------------    HPI: Alia Villaseñor is a 31 year old year old female who presents for a kidney donor evaluation.  Patient would like to donate as a NDD.      Personal history of:   No    Yes  Cancer:    [x]      []             Comment:     Diabetes   [x]      []  Comment:    Thombosis   [x]      []  Comment:       Hepatitis   [x]      []  Comment:    Tuberculosis   [x]      []  Comment:   Back or neck pain:  [x]      []  Comment:      Kidney stones   [x]      []  Comment:                  Kidney infections  [x]      []  Comment:           Urinary retention  [x]      []            Comment:   Regular NSAID use:  [x]      []            Comment:      Constipation:   [x]      []            Comment:      Hinduism  [x]      []            Comment:      Other:    []      [x]            Comment:   Anxiety and birth control      Past Medical History:   Diagnosis Date     Anxiety     social anxiety     No past surgical history on file.  No family history on file.  Social History     Socioeconomic History     Marital status: Single     Spouse name: Not on file     Number of children: Not on file     Years of education: Not on file     Highest education level: Not on file   Occupational History     Not on file   Tobacco Use     Smoking status: Never Smoker     Smokeless tobacco: Never Used   Substance and Sexual Activity     Alcohol use: Yes     Comment: 1 DRINK 3-4 DAYS A WEEK     Drug use: Never     Sexual activity: Not on file   Other Topics Concern     Not on file   Social History Narrative     Not on file     Social Determinants of  Health     Financial Resource Strain:      Difficulty of Paying Living Expenses:    Food Insecurity:      Worried About Running Out of Food in the Last Year:      Ran Out of Food in the Last Year:    Transportation Needs:      Lack of Transportation (Medical):      Lack of Transportation (Non-Medical):    Physical Activity:      Days of Exercise per Week:      Minutes of Exercise per Session:    Stress:      Feeling of Stress :    Social Connections:      Frequency of Communication with Friends and Family:      Frequency of Social Gatherings with Friends and Family:      Attends Spiritism Services:      Active Member of Clubs or Organizations:      Attends Club or Organization Meetings:      Marital Status:    Intimate Partner Violence:      Fear of Current or Ex-Partner:      Emotionally Abused:      Physically Abused:      Sexually Abused:        ROS:   CONSTITUTIONAL:  No fevers or chills  EYES: negative for icterus  ENT:  negative for hearing loss, tinnitus and sore throat  RESPIRATORY:  negative for cough, sputum, dyspnea  CARDIOVASCULAR:  negative for chest pain  GASTROINTESTINAL:  negative for nausea, vomiting, diarrhea or constipation  GENITOURINARY:  negative for incontinence, dysuria, bladder emptying problems  HEME:  No easy bruising  INTEGUMENT:  negative for rash and pruritus  NEURO:  Negative for headache, seizure disorder    Allergies:   No Known Allergies    Medications:  Prescription Medications as of 10/1/2021       Rx Number Disp Refills Start End Last Dispensed Date Next Fill Date Owning Pharmacy    norethindrone-ethinyl estradiol (MICROGESTIN 1/20) 1-20 MG-MCG tablet    9/21/2021        Sig: TAKE 1 TABLET BY MOUTH DAILY(ACTIVE TABLETS ONLY) FOR 9 WEEKS THEN 1 WEEK OFF THEN REPEAT TABLET CYCLE    Class: Historical    sertraline (ZOLOFT) 50 MG tablet    9/25/2021        Sig: Take 150 mg by mouth    Class: Historical    Route: Oral    sertraline (ZOLOFT) 50 MG tablet    9/25/2021        Sig: Take 2  tablets by mouth daily    Class: Historical    Route: Oral      Clinic-Administered Medications as of 10/1/2021       Dose Frequency Start End    iohexol (OMNIPAQUE 300) injection 5 mL (Completed) 5 mL ONCE 10/1/2021 10/1/2021    Route: Intravenous    sodium chloride (PF) 0.9% PF flush 5 mL 5 mL ONCE 10/1/2021     Admin Instructions: Post infusion line flush.    Class: E-Prescribe    Route: Intracatheter          Exam:   Pulse:  [81] 81  BP: (107-123)/(71-79) 116/76  SpO2:  [99 %] 99 %  Body mass index is 20.99 kg/m .  Pulse:  [81] 81  BP: (107-123)/(71-79) 116/76  SpO2:  [99 %] 99 %  Appearance: in no apparent distress.   Skin: normal  Head and Neck: Normal, no rashes or jaundice  Respiratory: normal respiratory excursions, no audible wheeze  Cardiovascular: RRR  Abdomen: flat, No surgical scars   Extremeties: Edema, none  Neuro: without deficit       Diagnostics:   Recent Results (from the past 336 hour(s))   ABO and Rh    Collection Time: 09/27/21  7:24 AM   Result Value Ref Range    ABO/RH(D) O POS     SPECIMEN EXPIRATION DATE 20210930235900    Partial thromboplastin time    Collection Time: 09/27/21  7:36 AM   Result Value Ref Range    aPTT 24 22 - 38 Seconds   INR    Collection Time: 09/27/21  7:36 AM   Result Value Ref Range    INR 0.99 0.85 - 1.15   Blood Group A Subtype    Collection Time: 09/27/21  7:37 AM   Result Value Ref Range    A1 Antigen Type Negative     SPECIMEN EXPIRATION DATE 20210930235900    EBV Capsid Antibody IgM    Collection Time: 09/27/21  7:37 AM   Result Value Ref Range    EBV Capsid Lindy IgM Instrument Value 24.9 <36.0 U/mL    EBV Capsid Antibody IgM No detectable antibody. No detectable antibody.   EBV Capsid Antibody IgG    Collection Time: 09/27/21  7:37 AM   Result Value Ref Range    EBV Capsid Lindy IgG Instrument Value 38.6 (H) <18.0 U/mL    EBV Capsid Antibody IgG Positive (A) No detectable antibody.   CMV Antibody IgG    Collection Time: 09/27/21  7:37 AM   Result Value Ref Range     CMV Lindy IgG Instrument Value <0.20 <0.60 U/mL    CMV Antibody IgG No detectable antibody. No detectable antibody.    HCG quantitative pregnancy    Collection Time: 09/27/21  7:37 AM   Result Value Ref Range    hCG Quantitative <1 0 - 5 IU/L   West Nile Virus Antibody IgG IgM    Collection Time: 09/27/21  7:37 AM   Result Value Ref Range    West Nile IgG Serum 0.12 <=1.29 IV    West Nile IgM Serum 0.08 <=0.89 IV   Treponema Abs w Reflex to RPR and Titer    Collection Time: 09/27/21  7:37 AM   Result Value Ref Range    Treponema Antibody Total Nonreactive Nonreactive   HIV Antigen Antibody Combo Pretransplant    Collection Time: 09/27/21  7:37 AM   Result Value Ref Range    HIV Antigen Antibody Combo Pretransplant Nonreactive Nonreactive   Hepatitis C antibody    Collection Time: 09/27/21  7:37 AM   Result Value Ref Range    Hepatitis C Antibody Nonreactive Nonreactive   Hepatitis B surface antigen    Collection Time: 09/27/21  7:37 AM   Result Value Ref Range    Hepatitis B Surface Antigen Nonreactive Nonreactive   Hepatitis B Surface Antibody    Collection Time: 09/27/21  7:37 AM   Result Value Ref Range    Hepatitis B Surface Antibody 8.44 (H) <8.00 m[IU]/mL   Hepatitis B core antibody    Collection Time: 09/27/21  7:37 AM   Result Value Ref Range    Hepatitis B Core Antibody Total Nonreactive Nonreactive   CBC with platelets    Collection Time: 09/27/21  7:37 AM   Result Value Ref Range    WBC Count 4.5 4.0 - 11.0 10e3/uL    RBC Count 4.69 3.80 - 5.20 10e6/uL    Hemoglobin 14.5 11.7 - 15.7 g/dL    Hematocrit 44.3 35.0 - 47.0 %    MCV 95 78 - 100 fL    MCH 30.9 26.5 - 33.0 pg    MCHC 32.7 31.5 - 36.5 g/dL    RDW 12.5 10.0 - 15.0 %    Platelet Count 264 150 - 450 10e3/uL   Hemoglobin A1c    Collection Time: 09/27/21  7:37 AM   Result Value Ref Range    Hemoglobin A1C 5.0 0.0 - 5.6 %   Phosphorus    Collection Time: 09/27/21  7:37 AM   Result Value Ref Range    Phosphorus 3.7 2.5 - 4.5 mg/dL   Uric acid     Collection Time: 09/27/21  7:37 AM   Result Value Ref Range    Uric Acid 3.3 2.6 - 6.0 mg/dL   Lipid Profile    Collection Time: 09/27/21  7:37 AM   Result Value Ref Range    Cholesterol 215 (H) <200 mg/dL    Triglycerides 94 <150 mg/dL    Direct Measure HDL 76 >=50 mg/dL    LDL Cholesterol Calculated 120 (H) <=100 mg/dL    Non HDL Cholesterol 139 (H) <130 mg/dL    Patient Fasting > 8hrs? Unknown    Comprehensive metabolic panel    Collection Time: 09/27/21  7:37 AM   Result Value Ref Range    Sodium 139 133 - 144 mmol/L    Potassium 4.1 3.4 - 5.3 mmol/L    Chloride 108 94 - 109 mmol/L    Carbon Dioxide (CO2) 26 20 - 32 mmol/L    Anion Gap 5 3 - 14 mmol/L    Urea Nitrogen 12 7 - 30 mg/dL    Creatinine 0.81 0.52 - 1.04 mg/dL    Calcium 8.5 8.5 - 10.1 mg/dL    Glucose 89 70 - 99 mg/dL    Alkaline Phosphatase 113 40 - 150 U/L    AST 15 0 - 45 U/L    ALT 18 0 - 50 U/L    Protein Total 6.9 6.8 - 8.8 g/dL    Albumin 3.4 3.4 - 5.0 g/dL    Bilirubin Total 0.3 0.2 - 1.3 mg/dL    GFR Estimate >90 >60 mL/min/1.73m2   Adult Type and Screen    Collection Time: 09/27/21  7:37 AM   Result Value Ref Range    ABO/RH(D) O POS     Antibody Screen NEGATIVE     SPECIMEN EXPIRATION DATE 20210930235900    Asymptomatic COVID-19 Virus (Coronavirus) by PCR Nasopharyngeal    Collection Time: 09/27/21  7:38 AM    Specimen: Nasopharyngeal; Swab   Result Value Ref Range    SARS CoV2 PCR Negative Negative   ABO and Rh 2nd type and screen required    Collection Time: 09/27/21  7:38 AM   Result Value Ref Range    ABO/RH(D) O POS     SPECIMEN EXPIRATION DATE [id number]    Quantiferon TB Gold Plus Grey Tube    Collection Time: 09/27/21  7:38 AM    Specimen: Peripheral Blood   Result Value Ref Range    Quantiferon Nil Tube 0.06 IU/mL   Quantiferon TB Gold Plus Green Tube    Collection Time: 09/27/21  7:38 AM    Specimen: Peripheral Blood   Result Value Ref Range    Quantiferon TB1 Tube 0.07 IU/mL   Quantiferon TB Gold Plus Yellow Tube     Collection Time: 09/27/21  7:38 AM    Specimen: Peripheral Blood   Result Value Ref Range    Quantiferon TB2 Tube 0.09    Quantiferon TB Gold Plus Purple Tube    Collection Time: 09/27/21  7:38 AM    Specimen: Peripheral Blood   Result Value Ref Range    Quantiferon Mitogen 10.00 IU/mL   Quantiferon TB Gold Plus    Collection Time: 09/27/21  7:38 AM    Specimen: Peripheral Blood   Result Value Ref Range    Quantiferon-TB Gold Plus Negative Negative    TB1 Ag minus Nil Value 0.01 IU/mL    TB2 Ag minus Nil Value 0.03 IU/mL    Mitogen minus Nil Result 9.94 IU/mL    Nil Result 0.06 IU/mL   Protein  random urine with Creat Ratio    Collection Time: 09/27/21  7:53 AM   Result Value Ref Range    Total Protein Random Urine g/L 0.11 g/L    Total Protein Urine g/gr Creatinine 0.12 0.00 - 0.20 g/g Cr    Creatinine Urine mg/dL 92 mg/dL   Albumin Random Urine Quantitative with Creat Ratio    Collection Time: 09/27/21  7:53 AM   Result Value Ref Range    Creatinine Urine mg/dL 92 mg/dL    Albumin Urine mg/L 6 mg/L    Albumin Urine mg/g Cr 6.52 0.00 - 25.00 mg/g Cr   Routine UA with microscopic    Collection Time: 09/27/21  7:53 AM   Result Value Ref Range    Color Urine Yellow Colorless, Straw, Light Yellow, Yellow    Appearance Urine Clear Clear    Glucose Urine Negative Negative mg/dL    Bilirubin Urine Negative Negative    Ketones Urine Negative Negative mg/dL    Specific Gravity Urine 1.016 1.003 - 1.035    Blood Urine Small (A) Negative    pH Urine 5.0 5.0 - 7.0    Protein Albumin Urine Negative Negative mg/dL    Urobilinogen Urine Normal Normal, 2.0 mg/dL    Nitrite Urine Negative Negative    Leukocyte Esterase Urine Negative Negative    Bacteria Urine Few (A) None Seen /HPF    Mucus Urine Present (A) None Seen /LPF    RBC Urine 1 <=2 /HPF    WBC Urine 2 <=5 /HPF    Squamous Epithelials Urine <1 <=1 /HPF           Again, thank you for allowing me to participate in the care of your patient.        Sincerely,        Bushra  MD Misha, MD

## 2021-10-01 NOTE — PROGRESS NOTES
Psychosocial Evaluation  Living Organ Donation per OPTN Policy 14.1.A  Organ Type: unrelated, kidney, Non-directed donor  Presenting Information:  Silva Villaseñor presents to the [facility] to complete a psychosocial evaluation since she is interested in becoming a kidney donor for someone in need as a non-directed donor.    PERSONAL BACKGROUND:  Current Living Situation: Alia bought a house last year in [place], [place].  She lives alone     Education/Employment/Financial Situation: Alia majored in wild life management at the [employer], where she earned a bachelor's degree.  She now works at the [employer] as an  of the gardens and [employer].  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  Alia has worked there for 4 years.   She also works part time at a local [employer] and makes good money there.  She does this second job so that she can afford to do improvements to her new home.  Alia denies that she would suffer an financial hardship as a result of kidney donation.  Alia has paid time off and an additional 40 hour of paid leave to be an organ donor.      Health Insurance Status: has health care insurance    Family History: Alia is single.  Parents still alive and  and live in [place], [place].  I older sister, a year older.      General Health: [facility] in [place] is Alia's regular health care clinic.  And IM[facility] Tadeo, [facility] is Alia's primary care provider.  Alia does not have a health care directive at this time.    Mental Health: Alia tells me that she has anxiety.  She takes sertraline, two, 50 mg tablets per day, both at tablets are taken at night.  She states that this medication works really well for her.  She has been on this for 2 years.  She said that it took some time and  trial of other selective serotonin reuptake inhibitor's before she found the one that worked best for her.  Her primary care provider prescribes this for her.  She is not engaged in counseling at this time.  As a teenager, she has some struggles with suicidal thoughts, but no history of attempts and no history of psychiatric hospitalizations.  Alia reports that her anxiety is more social anxiety and comes out more when she is in romantic or dating relationships.      Alcohol and Drug Use/Abuse/Dependency: Alia drinks about 1 beer or 1 serving of wine, 3 to 5 times per week.  Denies any past history of abuse or dependency on alcohol or illicit drugs. Denies any current use of street drugs, including marijuana.  Denies any past history of negative consequences of use of alcohol or drugs such as a DUI, relationship problems, or problems with work or home life.       Cigarette Use: no cigarettes    Legal: no legal issues    Coping with major surgery/associated stress: Alia loves spending time with her pets.  She has a ton of house plants to care for.  She also loves yoga, reading, and napping.      Support System: Alia says that her mom would be the the caregiver.  She is retired.      DONOR SPECIFIC INFORMATION:  Relationship to Recipient: not applicable.  She wants to be a non-directed donor.      Decision Process/Motivation to Donate: Alia has been thinking about being a donor for 9 years.  She says that it almost feels selfish not to be a donor, since she has two good kidneys and only needs one.  She donates blood.  She's on the bone marrow registry.  Alia says that she has four special needs pets, and that it is just a part of who she is to want to help others in this way.  Alia denies feeling any pressure or coercion to donate.  She denies being offered any form of payment to be a donor.    PREPARATION FOR DONATION, RECOVERY, AND POTENTIAL SHORT-LONG-TERM OUTCOMES:  Understanding of the Living Donation  Process:   We discussed the role of the donor  and Independent Donor Advocate.  Short and long term medical and psychosocial risks to both, donor and recipient were reviewed and Alia is able to report these risks.  High risk behaviors as defined by [place] Public Health Services (PHS) that have potential to increase risk of disease transmission were reviewed and she denies any high risk behaviors. Post surgical restrictions (2 weeks no driving, 6 weeks no lifting over 10 lbs) were reviewed and she appears capable of adhering to the post surgical requirements. The need for a caregiver was discussed and her mother will be her caregiver post surgery .  The risk of poor psychosocial outcome including problems with body image, post-surgery depression or anxiety, or feelings of emotional distress or bereavement if recipient experiences any recurrent disease, poor outcome or death was reviewed.  Additionally, potential financial implications, including the risk of having difficulty obtaining health care insurance, life insurance, disability insurance, or long term care insurance were reviewed, as were available donor grants to assist with donor related expenses.      We also discussed some unique issues that arise with paired kidney donation, which include the uncertainty of the timing and the importance of having a employment situation and support system that is able to provide sustained support and flexibility.    Ms. Villaseñor appears capable of understanding this information and making an informed medical decision.    Impressions/Recommendations:   Ms. Alia Villaseñor  is highly motivated to donate a kidney  to someone in need.  Her decision to donate is free of inducement, coercion, or other undue pressure.   Her housing, finances and employment are stable.  No current/active mental health or chemical abuse issues were identified.  The need for a caregiver was reviewed and she is able to identify a plan to  meet her post operative care needs.  She appears capable of making an informed medical decision.  No psychosocial contraindications to living organ donation were identified and  I support Alia s desire to donate a kidney to someone in need.         Contact Information:   MEG Carter, [facility]  Clinical  and Independent Donor Advocate  [facility]  Pager:  [phone]  Direct:  [phone]    Time Spent: 60 minutes      Living Kidney Donor Consent per OPTN Policy 14.3.A for Independent Living Donor Advocate (NHAN)    Written assurance has been obtained from the potential donor that he/she:   Is willing to donate  Is free from inducement and coercion  Has been informed that the he/she may decline to donate at any time  Has been informed that transplant centers must:   A) Offer donors an opportunity to discontinue the donor consent or evaluation process in a way that is protected and confidential  B) Provide an independent living donor advocate (NHAN) to assist the potential donor during this process    The following was presented to the potential donor in a language in which the potential donor is able to engage in meaningful dialogue:   Education and instruction about all phases of the living donation process including:   Consent  Medical and psychosocial evaluation  Information about the surgical procedure  Pre and post operative care  Benefits of post operative follow up  Disclosure that the recovery hospital will take all reasonable precautions to provide confidentiality for the donor/recipient  Disclosure that it is a federal crime for any person to knowingly acquire, obtain or otherwise transfer any human organ for valuable consideration  Disclosure that the recovery hospital must provide an independent living donor advocate (NHAN)  Disclosure that health information obtained during the evaluation is subject to the same regulations as all records  and could reveal conditions that must be reported to local, state, or federal public health authorities  Disclosure that the [facility] is required to report living donor follow up information at 6 months, 1 year, and 2 years, and that the potential donor must commit to post operative follow up testing coordinated by the [facility]    Disclosure has been provided that these risks may be transient or permanent & include but are not limited to:  Potential psychosocial risks:  Problems with body image  Post-surgery depression or anxiety  Feelings of emotional distress or bereavement if recipient experiences any recurrent disease or in the event of the recipient s death  Impact of donation on the donor s lifestyle    Potential financial impacts:  Personal expenses of travel, housing, , lost wages related to donation might not be reimbursed. However, resources may be available to defray some donation-related expenses   Need for life-long follow up at the donor s expense  Loss of employment or income  Negative impact on the ability to obtain future employment  Negative impact on the ability to obtain, maintain, or afford health, disability, and life insurance  Future health problems experienced by living donors following donation may not be covered by the recipient s insurance    Contact Information:  MEG Carter, [facility]  Clinical  and Independent Donor Advocate  [facility]  Pager:  [phone]  Direct:  [phone]    Time Spent: 60 minutes

## 2021-10-02 ENCOUNTER — HEALTH MAINTENANCE LETTER (OUTPATIENT)
Age: 31
End: 2021-10-02

## 2021-10-04 NOTE — PROGRESS NOTES
TRANSPLANT NEPHROLOGY DONOR EVALUATION    Assessment and Plan:  # Prospective Kidney Transplant Donor: Patient with no issues that need to be addressed prior to donation. Patient's blood pressure is acceptable at this visit, kidney function appears to be acceptable with Iohexol pending, and urinalysis is bland.    Discussed the risks of donating a kidney, including the surgical risk and the possible risks of living with one kidney.    Education about expected post-donation kidney function and how chronic kidney disease (CKD) and end stage kidney disease (ESKD) might potentially impact the donor in the future, include, but not limited to:       - On average, donors will have 25-35% permanent loss of kidney function at donation.       - Baseline risk of ESKD may slightly exceed that of members of the general population with the same demographic profile.       - Donor risks must be interpreted in light of known epidemiology of both CKD or ESKD, such as that CKD generally develops in midlife (40-50 years old) and ESKD generally develops after age 60.       - Medical evaluation of young potential donors cannot predict lifetime risk of CKD or ESKD.       - Donors may be at higher risk for CKD if they sustain damage to the remaining kidney.       - Development of CKD and progression of ESKD may be more rapid with only 1 kidney.       - Some type of kidney replacement therapy, either kidney transplant or dialysis, is required when reaching ESKD.    Potential medical or surgical risks include, but not limited to:       - Death.       - Scars, pain, fatigue, and other consequences typical of any surgical procedure.       - Decreased kidney function.       - Abdominal or bowel symptoms, such as bloating and nausea, and developing bowel obstruction.       - Kidney failure (ESKD) and the need for a kidney transplant or dialysis for the donor.       - Impact of obesity, hypertension, or other donor-specific medical conditions on  morbidity and mortality of the potential donor.    Patients overall evaluation will be discussed with the transplant team and a final recommendation on the patients' suitability to be a kidney transplant donor will be made at that time.    Consult:  Alia Villaseñor was seen in consultation at the request of Dr. Bushra Cabral for evaluation as a potential kidney transplant donor.    Reason for Visit:  Alia Villaseñor is a 31 year old female who presents for a kidney donor evaluation.  Patient would like to be a non-directed donor.    Present Condition and Donor-Related Medical History:    Energy level is good and has been normal.  She is active and does get some exercise.  Denies any chest pain or shortness of breath with exertion.  Appetite is good and no recent weight change.  No nausea, vomiting or diarrhea.  No fever, sweats or chills.  No leg swelling.  No pain or burning with urination.         Kidney Disease Hx:       h/o Kidney Problems: No  Family h/o Genetic Kidney Disease: No       h/o HTN: No    Usual BP: Doesn't know       h/o Protein in Urine: No  h/o Blood in Urine: No       h/o Kidney Stones: No  h/o Kidney Injury: No       h/o Recurrent UTI: No  h/o Genitourinary Problems: No       h/o Chronic NSAID Use: No         Other Medical Hx:       h/o DM: No             h/o Gastrointestinal, Pancreas or Liver Problems: No       h/o Lung or Heart Problems: No       h/o Hematologic Problems: No  h/o Bleeding or Clotting Problems: No       h/o Cancer: No       h/o Infection Problems: No       h/o Gestational DM: Not applicable      h/o Preeclampsia: Not applicable         Skin Cancer Risk:       h/o more than 50 moles: No       h/o extensive sun exposure: No       h/o melanoma: No       Family h/o melanoma: No         Mental Health Assessment:       h/o Depression: Yes: but not an issue now       h/o Psychiatric Illness: Yes: controlled       h/o Suicidal Attempt(s): No    COVID Status:  Vaccination Up To  Date: Yes  H/o COVID Infection: No     Review Of Systems:   A comprehensive review of systems was obtained and negative, except as noted in the HPI or PMH.    Past Medical History:   History was taken from the patient as noted below.  Past Medical History:   Diagnosis Date     Anxiety     social anxiety     Depression        Past Social History:   Past Surgical History:   Procedure Laterality Date     WISDOM TOOTH EXTRACTION       Personal or family history of anesthesia problems: No    Family History:   Family History   Problem Relation Age of Onset     Nephrolithiasis Father      Diabetes Paternal Uncle      Kidney Disease No family hx of      Hypertension No family hx of      Cancer No family hx of           Specific Family History in First Degree Relatives:       FH of Kidney Dz: No FH of DM: No       FH of HTN: No  FH of CAD: No       FH of Cancer: No  FH of Kidney Cancer: No    Personal History:   Social History     Socioeconomic History     Marital status: Single     Spouse name: Not on file     Number of children: 0     Years of education: Not on file     Highest education level: Not on file   Occupational History     Not on file   Tobacco Use     Smoking status: Never Smoker     Smokeless tobacco: Never Used   Substance and Sexual Activity     Alcohol use: Yes     Alcohol/week: 3.0 - 5.0 standard drinks     Types: 3 - 5 Standard drinks or equivalent per week     Comment: 1 DRINK 3-4 DAYS A WEEK     Drug use: Never     Sexual activity: Not on file   Other Topics Concern     Not on file   Social History Narrative     Not on file     Social Determinants of Health     Financial Resource Strain:      Difficulty of Paying Living Expenses:    Food Insecurity:      Worried About Running Out of Food in the Last Year:      Ran Out of Food in the Last Year:    Transportation Needs:      Lack of Transportation (Medical):      Lack of Transportation (Non-Medical):    Physical Activity:      Days of Exercise per Week:       Minutes of Exercise per Session:    Stress:      Feeling of Stress :    Social Connections:      Frequency of Communication with Friends and Family:      Frequency of Social Gatherings with Friends and Family:      Attends Anab[employer] Services:      Active Member of Clubs or Organizations:      Attends Club or Organization Meetings:      Marital Status:    Intimate Partner Violence:      Fear of Current or Ex-Partner:      Emotionally Abused:      Physically Abused:      Sexually Abused:           Specific Social History:       Health Insurance Status: Yes       Employment Status: Full time  Occupation: [employer]       Living Arrangements: lives alone       Social Support: Yes       Presence of increased risk for disease transmission behaviors as defined by PHS guidelines: No        Allergies:  No Known Allergies    Medications:  Current Outpatient Medications   Medication Sig     norethindrone-ethinyl estradiol (MICROGESTIN 1/20) 1-20 MG-MCG tablet TAKE 1 TABLET BY MOUTH DAILY(ACTIVE TABLETS ONLY) FOR 9 WEEKS THEN 1 WEEK OFF THEN REPEAT TABLET CYCLE     sertraline (ZOLOFT) 50 MG tablet Take 150 mg by mouth     sertraline (ZOLOFT) 50 MG tablet Take 2 tablets by mouth daily     No current facility-administered medications for this visit.     There are no discontinued medications.      Vitals:  Vital Signs 10/1/2021 10/1/2021 10/1/2021   Systolic 123 107 116   Diastolic 79 71 76   Pulse - - -   Weight (LB) - - -   Height - - -   BMI (Calculated) - - -   O2 - - -       Exam:   GENERAL APPEARANCE: alert and no distress  HENT: mouth without ulcers or lesions  LYMPHATICS: no cervical or supraclavicular nodes  RESP: lungs clear to auscultation - no rales, rhonchi or wheezes  CV: regular rhythm, normal rate, no rub, no murmur  EDEMA: no LE edema bilaterally  ABDOMEN: soft, nondistended, nontender, bowel sounds normal  MS: extremities normal - no gross deformities noted, no evidence of inflammation in joints, no muscle  tenderness  SKIN: no rash    Results:   Labs and imaging were ordered for this visit and reviewed by me.  Recent Results (from the past 336 hour(s))   ABO and Rh    Collection Time: 09/27/21  7:24 AM   Result Value Ref Range    ABO/RH(D) O POS     SPECIMEN EXPIRATION DATE [id number]    Partial thromboplastin time    Collection Time: 09/27/21  7:36 AM   Result Value Ref Range    aPTT 24 22 - 38 Seconds   INR    Collection Time: 09/27/21  7:36 AM   Result Value Ref Range    INR 0.99 0.85 - 1.15   Blood Group A Subtype    Collection Time: 09/27/21  7:37 AM   Result Value Ref Range    A1 Antigen Type Negative     SPECIMEN EXPIRATION DATE [id number]    EBV Capsid Antibody IgM    Collection Time: 09/27/21  7:37 AM   Result Value Ref Range    EBV Capsid Lindy IgM Instrument Value 24.9 <36.0 U/mL    EBV Capsid Antibody IgM No detectable antibody. No detectable antibody.   EBV Capsid Antibody IgG    Collection Time: 09/27/21  7:37 AM   Result Value Ref Range    EBV Capsid Lindy IgG Instrument Value 38.6 (H) <18.0 U/mL    EBV Capsid Antibody IgG Positive (A) No detectable antibody.   CMV Antibody IgG    Collection Time: 09/27/21  7:37 AM   Result Value Ref Range    CMV Lindy IgG Instrument Value <0.20 <0.60 U/mL    CMV Antibody IgG No detectable antibody. No detectable antibody.    HCG quantitative pregnancy    Collection Time: 09/27/21  7:37 AM   Result Value Ref Range    hCG Quantitative <1 0 - 5 IU/L   West Nile Virus Antibody IgG IgM    Collection Time: 09/27/21  7:37 AM   Result Value Ref Range    West Nile IgG Serum 0.12 <=1.29 IV    West Nile IgM Serum 0.08 <=0.89 IV   Treponema Abs w Reflex to RPR and Titer    Collection Time: 09/27/21  7:37 AM   Result Value Ref Range    Treponema Antibody Total Nonreactive Nonreactive   HIV Antigen Antibody Combo Pretransplant    Collection Time: 09/27/21  7:37 AM   Result Value Ref Range    HIV Antigen Antibody Combo Pretransplant Nonreactive Nonreactive   Hepatitis C antibody     Collection Time: 09/27/21  7:37 AM   Result Value Ref Range    Hepatitis C Antibody Nonreactive Nonreactive   Hepatitis B surface antigen    Collection Time: 09/27/21  7:37 AM   Result Value Ref Range    Hepatitis B Surface Antigen Nonreactive Nonreactive   Hepatitis B Surface Antibody    Collection Time: 09/27/21  7:37 AM   Result Value Ref Range    Hepatitis B Surface Antibody 8.44 (H) <8.00 m[IU]/mL   Hepatitis B core antibody    Collection Time: 09/27/21  7:37 AM   Result Value Ref Range    Hepatitis B Core Antibody Total Nonreactive Nonreactive   CBC with platelets    Collection Time: 09/27/21  7:37 AM   Result Value Ref Range    WBC Count 4.5 4.0 - 11.0 10e3/uL    RBC Count 4.69 3.80 - 5.20 10e6/uL    Hemoglobin 14.5 11.7 - 15.7 g/dL    Hematocrit 44.3 35.0 - 47.0 %    MCV 95 78 - 100 fL    MCH 30.9 26.5 - 33.0 pg    MCHC 32.7 31.5 - 36.5 g/dL    RDW 12.5 10.0 - 15.0 %    Platelet Count 264 150 - 450 10e3/uL   Hemoglobin A1c    Collection Time: 09/27/21  7:37 AM   Result Value Ref Range    Hemoglobin A1C 5.0 0.0 - 5.6 %   Phosphorus    Collection Time: 09/27/21  7:37 AM   Result Value Ref Range    Phosphorus 3.7 2.5 - 4.5 mg/dL   Uric acid    Collection Time: 09/27/21  7:37 AM   Result Value Ref Range    Uric Acid 3.3 2.6 - 6.0 mg/dL   Lipid Profile    Collection Time: 09/27/21  7:37 AM   Result Value Ref Range    Cholesterol 215 (H) <200 mg/dL    Triglycerides 94 <150 mg/dL    Direct Measure HDL 76 >=50 mg/dL    LDL Cholesterol Calculated 120 (H) <=100 mg/dL    Non HDL Cholesterol 139 (H) <130 mg/dL    Patient Fasting > 8hrs? Unknown    Comprehensive metabolic panel    Collection Time: 09/27/21  7:37 AM   Result Value Ref Range    Sodium 139 133 - 144 mmol/L    Potassium 4.1 3.4 - 5.3 mmol/L    Chloride 108 94 - 109 mmol/L    Carbon Dioxide (CO2) 26 20 - 32 mmol/L    Anion Gap 5 3 - 14 mmol/L    Urea Nitrogen 12 7 - 30 mg/dL    Creatinine 0.81 0.52 - 1.04 mg/dL    Calcium 8.5 8.5 - 10.1 mg/dL    Glucose 89 70  - 99 mg/dL    Alkaline Phosphatase 113 40 - 150 U/L    AST 15 0 - 45 U/L    ALT 18 0 - 50 U/L    Protein Total 6.9 6.8 - 8.8 g/dL    Albumin 3.4 3.4 - 5.0 g/dL    Bilirubin Total 0.3 0.2 - 1.3 mg/dL    GFR Estimate >90 >60 mL/min/1.73m2   Adult Type and Screen    Collection Time: 09/27/21  7:37 AM   Result Value Ref Range    ABO/RH(D) O POS     Antibody Screen NEGATIVE     SPECIMEN EXPIRATION DATE [id number]    Asymptomatic COVID-19 Virus (Coronavirus) by PCR Nasopharyngeal    Collection Time: 09/27/21  7:38 AM    Specimen: Nasopharyngeal; Swab   Result Value Ref Range    SARS CoV2 PCR Negative Negative   ABO and Rh 2nd type and screen required    Collection Time: 09/27/21  7:38 AM   Result Value Ref Range    ABO/RH(D) O POS     SPECIMEN EXPIRATION DATE [id number]    Quantiferon TB Gold Plus Grey Tube    Collection Time: 09/27/21  7:38 AM    Specimen: Peripheral Blood   Result Value Ref Range    Quantiferon Nil Tube 0.06 IU/mL   Quantiferon TB Gold Plus Green Tube    Collection Time: 09/27/21  7:38 AM    Specimen: Peripheral Blood   Result Value Ref Range    Quantiferon TB1 Tube 0.07 IU/mL   Quantiferon TB Gold Plus Yellow Tube    Collection Time: 09/27/21  7:38 AM    Specimen: Peripheral Blood   Result Value Ref Range    Quantiferon TB2 Tube 0.09    Quantiferon TB Gold Plus Purple Tube    Collection Time: 09/27/21  7:38 AM    Specimen: Peripheral Blood   Result Value Ref Range    Quantiferon Mitogen 10.00 IU/mL   Quantiferon TB Gold Plus    Collection Time: 09/27/21  7:38 AM    Specimen: Peripheral Blood   Result Value Ref Range    Quantiferon-TB Gold Plus Negative Negative    TB1 Ag minus Nil Value 0.01 IU/mL    TB2 Ag minus Nil Value 0.03 IU/mL    Mitogen minus Nil Result 9.94 IU/mL    Nil Result 0.06 IU/mL   Protein  random urine with Creat Ratio    Collection Time: 09/27/21  7:53 AM   Result Value Ref Range    Total Protein Random Urine g/L 0.11 g/L    Total Protein Urine g/gr Creatinine 0.12 0.00 -  0.20 g/g Cr    Creatinine Urine mg/dL 92 mg/dL   Albumin Random Urine Quantitative with Creat Ratio    Collection Time: 09/27/21  7:53 AM   Result Value Ref Range    Creatinine Urine mg/dL 92 mg/dL    Albumin Urine mg/L 6 mg/L    Albumin Urine mg/g Cr 6.52 0.00 - 25.00 mg/g Cr   Routine UA with microscopic    Collection Time: 09/27/21  7:53 AM   Result Value Ref Range    Color Urine Yellow Colorless, Straw, Light Yellow, Yellow    Appearance Urine Clear Clear    Glucose Urine Negative Negative mg/dL    Bilirubin Urine Negative Negative    Ketones Urine Negative Negative mg/dL    Specific Gravity Urine 1.016 1.003 - 1.035    Blood Urine Small (A) Negative    pH Urine 5.0 5.0 - 7.0    Protein Albumin Urine Negative Negative mg/dL    Urobilinogen Urine Normal Normal, 2.0 mg/dL    Nitrite Urine Negative Negative    Leukocyte Esterase Urine Negative Negative    Bacteria Urine Few (A) None Seen /HPF    Mucus Urine Present (A) None Seen /LPF    RBC Urine 1 <=2 /HPF    WBC Urine 2 <=5 /HPF    Squamous Epithelials Urine <1 <=1 /HPF   UA with Microscopic reflex to Culture    Collection Time: 10/01/21 12:03 PM    Specimen: Urine, Midstream   Result Value Ref Range    Color Urine Yellow Colorless, Straw, Light Yellow, Yellow    Appearance Urine Clear Clear    Glucose Urine Negative Negative mg/dL    Bilirubin Urine Negative Negative    Ketones Urine Negative Negative mg/dL    Specific Gravity Urine 1.021 1.003 - 1.035    Blood Urine Small (A) Negative    pH Urine 5.0 5.0 - 7.0    Protein Albumin Urine Negative Negative mg/dL    Urobilinogen Urine Normal Normal, 2.0 mg/dL    Nitrite Urine Negative Negative    Leukocyte Esterase Urine Negative Negative    Mucus Urine Present (A) None Seen /LPF    RBC Urine 2 <=2 /HPF    WBC Urine <1 <=5 /HPF    Squamous Epithelials Urine <1 <=1 /HPF   EKG 12-lead complete w/read - Clinics    Collection Time: 10/01/21 12:46 PM   Result Value Ref Range    Systolic Blood Pressure  mmHg    Diastolic  Blood Pressure  mmHg    Ventricular Rate 61 BPM    Atrial Rate 61 BPM    AK Interval 150 ms    QRS Duration 90 ms     ms    QTc 457 ms    P Axis 54 degrees    R AXIS 107 degrees    T Axis 54 degrees    Interpretation ECG       Sinus rhythm  Possible Left atrial enlargement  Rightward axis  Borderline ECG  No previous ECGs available  Confirmed by MD PORSHA, TIFFANIE ([id number]) on 10/1/2021 6:12:05 PM

## 2021-10-04 NOTE — PROGRESS NOTES
Saw Aila in clinic on 10/1/21 for Living Kidney Donor Evaluation.     Alia is interested in donation as a non-directed kidney donor through [facility].    I provided a folder which included copies of the followin. Living Kidney Donor Evaluation Consent  2. Paired Exchange Consent  3. Donor Shield Pamphlet  4. Living Donor Collective Study information  5. Kidney for Life pamphlet  6. Kidney Donors are Heroes! Study synopsis  7. SRTR Data from 21.      I also provided a parking pass.      I reviewed the Living Kidney Donor Evaluation Consent, dated 2020 and Paired Exchange/ NDD consent dated 2018.  I answered any question.    Evaluation Notes:    Repeated UA per Dr. Cabral's note; will review at committee    Alia is vaccinated against COVID 19

## 2021-10-04 NOTE — PATIENT INSTRUCTIONS
Alia-    Thank you so much for coming in to donor evaluation clinic on 10/1/21!    Please review the packet you received at the time of your evaluation.    Your next step will be for our team to review your test results from your visit. While you wait, please consider signing up for our [facility] living donor learning modules at [email or web address]    Other resources for learning are:    [email or web address]    [email or web address]     Thank you again for taking the time and effort to learn more about how you can help those in need!    Gabriel    [facility] [phone]

## 2021-10-05 LAB
BSA: 1.58 M2
IOHEXOL CL UR+SERPL-VRATE: 101 /1.73 M2
IOHEXOL CL UR+SERPL-VRATE: 3.91 MG/DL
IOHEXOL CL UR+SERPL-VRATE: 9.14 MG/DL
IOHEXOL CL UR+SERPL-VRATE: 92 ML/MIN

## 2021-10-06 ENCOUNTER — COMMITTEE REVIEW (OUTPATIENT)
Dept: TRANSPLANT | Facility: CLINIC | Age: 31
End: 2021-10-06

## 2021-10-06 NOTE — COMMITTEE REVIEW
Living Donor Committee Review Note Evaluation Date: 10/1/2021  Committee Review Date: 10/6/2021    Donor being evaluated for: Kidney    Transplant Phase: Evaluation  Transplant Status: Active    Transplant Coordinator: Dominga Gutierrez  Transplant Surgeon:       Committee Review Members:  Immunology Yvette Du   Nephrology Irineo Galarza MD, Khanh Sykes MD, Scott Jolley MD   Nutrition Lin Bynum,    Pharmacist Nirav Cifuentes, [facility], Shayy Culp, [facility]    - Clinical Annika Purvis, [facility], Mildred Reeves, [facility]   Transplant Yuly Ceja, RN, Gabriel Gifford, RN, Augusta Estrada, N, Kasandra Lobo, JOSE D, Esperanza Denson, APRN CNP, Dominga Gutierrez, JOSE D, Alia Purvis, RN   Transplant Surgery Carolyn Chew MD, Bushra Cabral MD, MD       Transplant Eligibility: Acceptable Mental Health, pending final surgery review    Committee Review Decision: Approved    Relative Contraindications: None    Absolute Contraindications: None    Committee Chair Carolyn Chew MD verbally attested to the committee's decision.    Committee Discussion Details:     Reviewed Alia's abnormal evaluation findings:    -EKG- okay, no further cardiac evaluation advised  -CXR- okay, no further evaluation needed  -UA- okay, no need for repeat or further evaluation    Also of note:     SW consult reviewed- no neuropsych testing advised, team agreed with this recommendation    Imaging previously reviewed:   October 5, 2021 5:40 PM - Dominga Gutierrez RN:   Patient's abdominal CT reviewed on 10/5/2021 with the following surgeon(s) present: Dr. Cole  Suggested side for kidney for donation is LEFT however needs further review to determine if appropriate for donation based on kidney vasculature.  Incidental findings reviewed: none    Donor approved pending imaging review by surgery.     Attempted to reach Alia to update her of above, VM left.   Plan to follow up with surgery regarding  imaging.

## 2021-10-07 ENCOUNTER — DOCUMENTATION ONLY (OUTPATIENT)
Dept: TRANSPLANT | Facility: CLINIC | Age: 31
End: 2021-10-07

## 2021-10-07 DIAGNOSIS — Z00.5 TRANSPLANT DONOR EVALUATION: Primary | ICD-10-CM

## 2021-10-07 LAB
A*: NORMAL
A*LOCUS SEROLOGIC EQUIVALENT: 11
A*LOCUS: NORMAL
A*SEROLOGIC EQUIVALENT: 24
ABTEST METHOD: NORMAL
B*LOCUS SEROLOGIC EQUIVALENT: 35
B*LOCUS: NORMAL
BW-1: NORMAL
C*LOCUS SEROLOGIC EQUIVALENT: 4
C*LOCUS: NORMAL
DPA1*: NORMAL
DPB1*: NORMAL
DPB1*LOCUS NMDP: NORMAL
DPB1*LOCUS: NORMAL
DPB1*NMDP: NORMAL
DQA1*LOCUS: NORMAL
DQB1*LOCUS SEROLOGIC EQUIVALENT: 5
DQB1*LOCUS: NORMAL
DRB1*: NORMAL
DRB1*LOCUS SEROLOGIC EQUIVALENT: 1
DRB1*LOCUS: NORMAL
DRB1*SEROLOGIC EQUIVALENT: NORMAL
DRSSO TEST METHOD: NORMAL

## 2021-10-07 NOTE — PROGRESS NOTES
Patient's abdominal CT reviewed on 10/7/21 with the following surgeon(s) present: Dr. Cabral    Suggested side for kidney for donation is: needs split fx to further assess candidacy as a donor.      Incidental findings reviewed: HANNAH Rojo updated of above. She confirmed desire to proceed. Order placed and message routed for scheduling.

## 2021-10-22 ENCOUNTER — HOSPITAL ENCOUNTER (OUTPATIENT)
Dept: NUCLEAR MEDICINE | Facility: CLINIC | Age: 31
Setting detail: NUCLEAR MEDICINE
Discharge: HOME | End: 2021-10-22
Attending: INTERNAL MEDICINE | Admitting: INTERNAL MEDICINE

## 2021-10-22 DIAGNOSIS — Z00.5 TRANSPLANT DONOR EVALUATION: ICD-10-CM

## 2021-10-22 PROCEDURE — 343N000001 HC RX 343: Performed by: INTERNAL MEDICINE

## 2021-10-22 PROCEDURE — 78707 K FLOW/FUNCT IMAGE W/O DRUG: CPT | Mod: 26 | Performed by: STUDENT IN AN ORGANIZED HEALTH CARE EDUCATION/TRAINING PROGRAM

## 2021-10-22 PROCEDURE — A9562 TC99M MERTIATIDE: HCPCS | Performed by: INTERNAL MEDICINE

## 2021-10-22 PROCEDURE — 78707 K FLOW/FUNCT IMAGE W/O DRUG: CPT

## 2021-10-22 RX ADMIN — TECHNESCAN TC 99M MERTIATIDE 10 MILLICURIE: 1 INJECTION, POWDER, LYOPHILIZED, FOR SOLUTION INTRAVENOUS at 13:35

## 2021-11-02 ENCOUNTER — TELEPHONE (OUTPATIENT)
Dept: TRANSPLANT | Facility: CLINIC | Age: 31
End: 2021-11-02

## 2021-11-02 ENCOUNTER — DOCUMENTATION ONLY (OUTPATIENT)
Dept: TRANSPLANT | Facility: CLINIC | Age: 31
End: 2021-11-02

## 2021-11-02 NOTE — PROGRESS NOTES
Patient's abdominal CT reviewed on 11/2/2021 with the following surgeon(s) present: Dr. Cabral     Suggested side for kidney for donation is NA- based on CT review and split fx renogram recommendation made that Alia not proceed as a donor.

## 2021-11-03 ENCOUNTER — COMMITTEE REVIEW (OUTPATIENT)
Dept: TRANSPLANT | Facility: CLINIC | Age: 31
End: 2021-11-03

## 2021-11-03 NOTE — COMMITTEE REVIEW
Living Donor Committee Review Note Evaluation Date: 10/1/2021  Committee Review Date: 11/3/2021    Donor being evaluated for: Kidney    Transplant Phase: Evaluation  Transplant Status: Declined    Transplant Coordinator: Dominga Gutierrez  Transplant Surgeon:       Committee Review Members:  Nephrology Irineo Galarza MD, Scott Jolley MD   Nutrition Lin Bynum, HANNY   Pharmacist Nirav Cifuentes, [facility]    - Clinical Annika Purvis, [facility]   Transplant Deb Jo Ann Allen, RN, Purvi Parry, NP, Gabriel Gifford, RN, Augusta Estrada, [place], Kasandra Lobo, RN, Dominga Gutierrez, JOSE D, Alia Purvis, RN   Transplant Surgery Bushra Cabral MD, MD       Transplant Eligibility: Acceptable Mental Health    Committee Review Decision: Declined    Relative Contraindications: None    Absolute Contraindications: Other, anatomy    Committee Chair Bushra Cabral MD verbally attested to the committee's decision.    Committee Discussion Details:     Per CT review by surgeon donor denied based on anatomy as left kidney with nutcracker accounts for ~57% of kidney function so not appropriate to donate.     Alia updated of above. She verbalized understanding. Thanked Alia for coming forward.

## 2022-05-04 NOTE — LETTER
Ms. Alia RAMIREZ Kasi   [address] [place] [address]   November 3, 2021     Dear Alia   I am writing on behalf of the Transplant Program at the [facility]. I would like to take this opportunity to thank you for recently undergoing an evaluation as a possible kidney donor.   Your evaluation is complete and your test results have been reviewed and discussed by the donor team at our weekly meeting. After careful review of the details of your evaluation, the donor team has determined that you do not meet criteria for living kidney donation and should not be a kidney donor. I would be happy to discuss the details of your evaluation testing with you.   Once again, thank you for your generous offer and for your time and effort that went into this process.   Sincerely,  Dominga Gutierrez RN  Living Donor Coordinator      76

## 2023-01-14 ENCOUNTER — HEALTH MAINTENANCE LETTER (OUTPATIENT)
Age: 33
End: 2023-01-14

## 2024-02-11 ENCOUNTER — HEALTH MAINTENANCE LETTER (OUTPATIENT)
Age: 34
End: 2024-02-11